# Patient Record
Sex: MALE | Race: WHITE | NOT HISPANIC OR LATINO | Employment: FULL TIME | URBAN - METROPOLITAN AREA
[De-identification: names, ages, dates, MRNs, and addresses within clinical notes are randomized per-mention and may not be internally consistent; named-entity substitution may affect disease eponyms.]

---

## 2018-12-10 ENCOUNTER — OFFICE VISIT (OUTPATIENT)
Dept: FAMILY MEDICINE CLINIC | Facility: CLINIC | Age: 33
End: 2018-12-10
Payer: COMMERCIAL

## 2018-12-10 VITALS
DIASTOLIC BLOOD PRESSURE: 88 MMHG | HEART RATE: 86 BPM | WEIGHT: 252 LBS | HEIGHT: 73 IN | BODY MASS INDEX: 33.4 KG/M2 | TEMPERATURE: 99.1 F | SYSTOLIC BLOOD PRESSURE: 136 MMHG

## 2018-12-10 DIAGNOSIS — F40.243 FEAR OF FLYING: ICD-10-CM

## 2018-12-10 DIAGNOSIS — G47.00 INSOMNIA, UNSPECIFIED TYPE: Primary | ICD-10-CM

## 2018-12-10 PROCEDURE — 3008F BODY MASS INDEX DOCD: CPT | Performed by: NURSE PRACTITIONER

## 2018-12-10 PROCEDURE — 99203 OFFICE O/P NEW LOW 30 MIN: CPT | Performed by: NURSE PRACTITIONER

## 2018-12-10 PROCEDURE — 1036F TOBACCO NON-USER: CPT | Performed by: NURSE PRACTITIONER

## 2018-12-10 RX ORDER — LORAZEPAM 0.5 MG/1
0.5 TABLET ORAL 2 TIMES DAILY PRN
Qty: 8 TABLET | Refills: 0 | Status: SHIPPED | OUTPATIENT
Start: 2018-12-10 | End: 2019-03-27 | Stop reason: ALTCHOICE

## 2018-12-10 RX ORDER — TRAZODONE HYDROCHLORIDE 50 MG/1
50-100 TABLET ORAL
Qty: 30 TABLET | Refills: 0 | Status: SHIPPED | OUTPATIENT
Start: 2018-12-10 | End: 2018-12-21 | Stop reason: SDUPTHER

## 2018-12-10 RX ORDER — HYDROXYZINE PAMOATE 50 MG/1
CAPSULE ORAL
Refills: 0 | COMMUNITY
Start: 2018-11-28 | End: 2018-12-10 | Stop reason: SDUPTHER

## 2018-12-10 RX ORDER — HYDROXYZINE PAMOATE 50 MG/1
50 CAPSULE ORAL
Qty: 30 CAPSULE | Refills: 0 | Status: SHIPPED | OUTPATIENT
Start: 2018-12-10 | End: 2019-03-27 | Stop reason: ALTCHOICE

## 2018-12-21 DIAGNOSIS — G47.00 INSOMNIA, UNSPECIFIED TYPE: ICD-10-CM

## 2018-12-21 RX ORDER — TRAZODONE HYDROCHLORIDE 50 MG/1
50-100 TABLET ORAL
Qty: 60 TABLET | Refills: 3 | Status: SHIPPED | OUTPATIENT
Start: 2018-12-21 | End: 2019-09-11 | Stop reason: SDUPTHER

## 2018-12-21 NOTE — TELEPHONE ENCOUNTER
Pt called requesting med refill for his trazodone 50mg to be sent to [facility] pharmacy  As per pt med is working for him and you had advised to call back when he needed his refill as per pt he only received a 15 day supply   Pt was seen 12/10/18

## 2019-03-27 ENCOUNTER — OFFICE VISIT (OUTPATIENT)
Dept: FAMILY MEDICINE CLINIC | Facility: CLINIC | Age: 34
End: 2019-03-27
Payer: COMMERCIAL

## 2019-03-27 VITALS
SYSTOLIC BLOOD PRESSURE: 160 MMHG | TEMPERATURE: 99 F | DIASTOLIC BLOOD PRESSURE: 94 MMHG | HEIGHT: 73 IN | BODY MASS INDEX: 32.13 KG/M2 | RESPIRATION RATE: 20 BRPM | WEIGHT: 242.4 LBS | HEART RATE: 96 BPM

## 2019-03-27 DIAGNOSIS — I10 ESSENTIAL HYPERTENSION: Primary | ICD-10-CM

## 2019-03-27 DIAGNOSIS — E66.09 CLASS 1 OBESITY DUE TO EXCESS CALORIES WITH SERIOUS COMORBIDITY AND BODY MASS INDEX (BMI) OF 31.0 TO 31.9 IN ADULT: ICD-10-CM

## 2019-03-27 PROBLEM — E66.811 CLASS 1 OBESITY DUE TO EXCESS CALORIES WITH SERIOUS COMORBIDITY AND BODY MASS INDEX (BMI) OF 31.0 TO 31.9 IN ADULT: Status: ACTIVE | Noted: 2019-03-27

## 2019-03-27 PROCEDURE — 1036F TOBACCO NON-USER: CPT | Performed by: NURSE PRACTITIONER

## 2019-03-27 PROCEDURE — 99214 OFFICE O/P EST MOD 30 MIN: CPT | Performed by: NURSE PRACTITIONER

## 2019-03-27 PROCEDURE — 3008F BODY MASS INDEX DOCD: CPT | Performed by: NURSE PRACTITIONER

## 2019-03-27 RX ORDER — LOSARTAN POTASSIUM 50 MG/1
50 TABLET ORAL DAILY
Qty: 30 TABLET | Refills: 0 | Status: SHIPPED | OUTPATIENT
Start: 2019-03-27 | End: 2019-04-22 | Stop reason: SDUPTHER

## 2019-03-27 NOTE — PROGRESS NOTES
Chief Complaint   Patient presents with    Hypertension     noticed 3 weeks ago  started with slight headache       Assessment/Plan:    Essential hypertension  Patient new diagnosis for hypertension  We will start losartan at 50mg daily  Recheck bp in 4 weeks  Diagnoses and all orders for this visit:    Essential hypertension  -     TSH, 3rd generation with Free T4 reflex; Future  -     Comprehensive metabolic panel  -     Lipid panel  -     losartan (COZAAR) 50 mg tablet; Take 1 tablet (50 mg total) by mouth daily    Class 1 obesity due to excess calories with serious comorbidity and body mass index (BMI) of 31 0 to 31 9 in adult    Other orders  -     Cancel: TDAP Vaccine greater than or equal to 6yo  -     Cancel: PREFERRED: influenza vaccine, [id number], quadrivalent, recombinant, PF, 0 5 mL (FLUBLOK)          Subjective:      Patient ID: Chester Goodpasture is a 35 y o  male  Patient states he felt weird and checked his blood pressure and it was elevated  He purchased a home machine and started keeping track  Patient went to [facility] where he received a reading of 130/90  He was told to stop checking so frequently  He did this and last BP reading was 164/ 99 while he was at home sitting on the couch  Patient states he been eating better and having more veggies  He lost some weight about 10 pounds  The following portions of the patient's history were reviewed and updated as appropriate: allergies, current medications, past family history, past medical history, past social history, past surgical history and problem list     Review of Systems   Constitutional: Negative for diaphoresis, fatigue and unexpected weight change  Feels off    HENT: Negative for trouble swallowing  Eyes: Negative for visual disturbance  Respiratory: Negative for cough, chest tightness and shortness of breath  Cardiovascular: Negative for chest pain, palpitations and leg swelling     Gastrointestinal: Negative for constipation  Endocrine: Negative for polydipsia, polyphagia and polyuria  Genitourinary: Negative for difficulty urinating  Musculoskeletal: Negative for arthralgias and myalgias  Neurological: Positive for headaches  Negative for dizziness and light-headedness  Psychiatric/Behavioral: Negative for sleep disturbance  Objective:      /94 (BP Location: Left arm, Patient Position: Sitting, Cuff Size: Standard)   Pulse 96   Temp 99 °F (37 2 °C) (Temporal)   Resp 20   Ht 6' 0 99" (1 854 m)   Wt 110 kg (242 lb 6 4 oz)   BMI 31 99 kg/m²          Physical Exam   Constitutional: He is oriented to person, place, and time  Vital signs are normal  He appears well-developed and well-nourished  He does not have a sickly appearance  He does not appear ill  HENT:   Head: Normocephalic and atraumatic  Mouth/Throat: Uvula is midline, oropharynx is clear and moist and mucous membranes are normal    Eyes: Pupils are equal, round, and reactive to light  Neck: No JVD present  Carotid bruit is not present  No thyromegaly present  Cardiovascular: Normal rate, regular rhythm, S1 normal, S2 normal and normal heart sounds  No murmur heard  No lower extremity edema    Pulmonary/Chest: Effort normal and breath sounds normal    Abdominal: Soft  Normal appearance and bowel sounds are normal    Lymphadenopathy:     He has no cervical adenopathy  Neurological: He is alert and oriented to person, place, and time  Skin: Skin is warm, dry and intact  Capillary refill takes less than 2 seconds  Psychiatric: He has a normal mood and affect  Thought content normal            BMI Counseling: Body mass index is 31 99 kg/m²  Discussed the patient's BMI with him  The BMI is above average  BMI counseling and education was provided to the patient   Nutrition recommendations include reducing portion sizes, 3-5 servings of fruits/vegetables daily, consuming healthier snacks, decreasing soda and/or juice intake, moderation in carbohydrate intake and reducing intake of saturated fat and trans fat  Exercise recommendations include moderate aerobic physical activity for 150 minutes/week and vigorous aerobic physical activity for 75 minutes/week

## 2019-03-27 NOTE — PATIENT INSTRUCTIONS
Chronic Hypertension, Ambulatory Care   GENERAL INFORMATION:   Chronic hypertension  is a long-term condition in which your blood pressure (BP) is higher than normal  Your BP is the force of your blood moving against the walls of your arteries  Hypertension is a BP of 140/90 or higher  Common symptoms include the following:   · Headache     · Blurred vision    · Chest pain     · Dizziness or weakness     · Trouble breathing     · Nosebleeds  Seek immediate care for the following symptoms:   · Severe headache or vision loss    · Weakness in an arm or leg    · Confusion or difficulty speaking    · Discomfort in your chest that feels like squeezing, pressure, fullness, or pain    · Suddenly feeling lightheaded or trouble breathing    · Pain or discomfort in your back, neck, jaw, stomach, or arm  Treatment for chronic hypertension  may include medicine to lower your BP  You may also need to make lifestyle changes  Take your medicine exactly as directed  Manage chronic hypertension:   · Take your BP at home  Sit and rest for 5 minutes before you take your BP  Extend your arm and support it on a flat surface  Your arm should be at the same level as your heart  Follow the directions that came with your BP monitor  If possible, take at least 2 BP readings each time  Take your BP at least twice a day at the same times each day, such as morning and evening  Keep a log of your BP readings and bring it to your follow-up visits  · Eat less sodium (salt)  Do not add sodium to your food  Limit foods that are high in sodium, such as canned foods, potato chips, and cold cuts  Your healthcare provider may suggest that you follow the [address] [place] [address] Street  The plan is low in sodium, unhealthy fats, and total fat  It is high in potassium, calcium, and fiber  · Exercise regularly  Exercise at least 30 minutes per day, on most days of the week  This will help decrease your BP   Ask your healthcare provider about the best exercise plan for you  · Limit alcohol  Women should limit alcohol to 1 drink a day  Men should limit alcohol to 2 drinks a day  A drink of alcohol is 12 ounces of beer, 5 ounces of wine, or 1½ ounces of liquor  · Do not smoke  If you smoke, it is never too late to quit  Smoking can increase your BP  Smoking also worsens other health conditions you may have that can increase your risk for hypertension  Ask your healthcare provider for information if you need help quitting  Follow up with your healthcare provider as directed: You will need to return to have your BP checked and to have other lab tests done  Write down your questions so you remember to ask them during your visits  CARE AGREEMENT:   You have the right to help plan your care  Learn about your health condition and how it may be treated  Discuss treatment options with your caregivers to decide what care you want to receive  You always have the right to refuse treatment  The above information is an  only  It is not intended as medical advice for individual conditions or treatments  Talk to your doctor, nurse or pharmacist before following any medical regimen to see if it is safe and effective for you  © 2014 [address] is for End User's use only and may not be sold, redistributed or otherwise used for commercial purposes  All illustrations and images included in CareNotes® are the copyrighted property of [employer]  or [employer]  Heart Healthy Diet   WHAT YOU NEED TO KNOW:   A heart healthy diet is an eating plan low in total fat, unhealthy fats, and sodium (salt)  A heart healthy diet helps decrease your risk for heart disease and stroke  Limit the amount of fat you eat to 25% to 35% of your total daily calories  Limit sodium to less than 2,300 mg each day  DISCHARGE INSTRUCTIONS:   Healthy fats:  Healthy fats can help improve cholesterol levels   The risk for heart disease is decreased when cholesterol levels are normal  Choose healthy fats, such as the following:  · Unsaturated fat  is found in foods such as soybean, canola, olive, corn, and safflower oils  It is also found in soft tub margarine that is made with liquid vegetable oil  · Omega-3 fat  is found in certain fish, such as salmon, tuna, and trout, and in walnuts and flaxseed  Unhealthy fats:  Unhealthy fats can cause unhealthy cholesterol levels in your blood and increase your risk of heart disease  Limit unhealthy fats, such as the following:  · Cholesterol  is found in animal foods, such as eggs and lobster, and in dairy products made from whole milk  Limit cholesterol to less than 300 milligrams (mg) each day  You may need to limit cholesterol to 200 mg each day if you have heart disease  · Saturated fat  is found in meats, such as grimaldo and hamburger  It is also found in chicken or turkey skin, whole milk, and butter  Limit saturated fat to less than 7% of your total daily calories  Limit saturated fat to less than 6% if you have heart disease or are at increased risk for it  · Trans fat  is found in packaged foods, such as potato chips and cookies  It is also in hard margarine, some fried foods, and shortening  Avoid trans fats as much as possible    Heart healthy foods and drinks to include:  Ask your dietitian or healthcare provider how many servings to have from each of the following food groups:  · Grains:      ¨ Whole-wheat breads, cereals, and pastas, and brown rice    ¨ Low-fat, low-sodium crackers and chips    · Vegetables:      ¨ Broccoli, green beans, green peas, and spinach    ¨ Collards, kale, and lima beans    ¨ Carrots, sweet potatoes, tomatoes, and peppers    ¨ Canned vegetables with no salt added    · Fruits:      ¨ Bananas, peaches, pears, and pineapple    ¨ Grapes, raisins, and dates    ¨ Oranges, tangerines, grapefruit, orange juice, and grapefruit juice    ¨ Apricots, mangoes, melons, and papaya    ¨ Raspberries and strawberries    ¨ Canned fruit with no added sugar    · Low-fat dairy products:      ¨ Nonfat (skim) milk, 1% milk, and low-fat almond, cashew, or soy milks fortified with calcium    ¨ Low-fat cheese, regular or frozen yogurt, and cottage cheese    · Meats and proteins , such as lean cuts of beef and pork (loin, leg, round), skinless chicken and turkey, legumes, soy products, egg whites, and nuts  Foods and drinks to limit or avoid:  Ask your dietitian or healthcare provider about these and other foods that are high in unhealthy fat, sodium, and sugar:  · Snack or packaged foods , such as frozen dinners, cookies, macaroni and cheese, and cereals with more than 300 mg of sodium per serving    · Canned or dry mixes  for cakes, soups, sauces, or gravies    · Vegetables with added sodium , such as instant potatoes, vegetables with added sauces, or regular canned vegetables    · Other foods high in sodium , such as ketchup, barbecue sauce, salad dressing, pickles, olives, soy sauce, and miso    · High-fat dairy foods  such as whole or 2% milk, cream cheese, or sour cream, and cheeses     · High-fat protein foods  such as high-fat cuts of beef (T-bone steaks, ribs), chicken or turkey with skin, and organ meats, such as liver    · Cured or smoked meats , such as hot dogs, grimaldo, and sausage    · Unhealthy fats and oils , such as butter, stick margarine, shortening, and cooking oils such as coconut or palm oil    · Food and drinks high in sugar , such as soft drinks (soda), sports drinks, sweetened tea, candy, cake, cookies, pies, and doughnuts  Other diet guidelines to follow:   · Eat more foods containing omega-3 fats  Eat fish high in omega-3 fats at least 2 times a week  · Limit alcohol  Too much alcohol can damage your heart and raise your blood pressure  Women should limit alcohol to 1 drink a day  Men should limit alcohol to 2 drinks a day   A drink of alcohol is 12 ounces of beer, 5 ounces of wine, or 1½ ounces of liquor  · Choose low-sodium foods  High-sodium foods can lead to high blood pressure  Add little or no salt to food you prepare  Use herbs and spices in place of salt  · Eat more fiber  to help lower cholesterol levels  Eat at least 5 servings of fruits and vegetables each day  Eat 3 ounces of whole-grain foods each day  Legumes (beans) are also a good source of fiber  Lifestyle guidelines:   · Do not smoke  Nicotine and other chemicals in cigarettes and cigars can cause lung and heart damage  Ask your healthcare provider for information if you currently smoke and need help to quit  E-cigarettes or smokeless tobacco still contain nicotine  Talk to your healthcare provider before you use these products  · Exercise regularly  to help you maintain a healthy weight and improve your blood pressure and cholesterol levels  Ask your healthcare provider about the best exercise plan for you  Do not start an exercise program without asking your healthcare provider  Follow up with your healthcare provider as directed:  Write down your questions so you remember to ask them during your visits  © 2017 [address] [facility] Information is for End User's use only and may not be sold, redistributed or otherwise used for commercial purposes  All illustrations and images included in CareNotes® are the copyrighted property of A D A M [employer] Inc  or [employer]use  The above information is an  only  It is not intended as medical advice for individual conditions or treatments  Talk to your doctor, nurse or pharmacist before following any medical regimen to see if it is safe and effective for you  Low Fat Diet   AMBULATORY CARE:   A low-fat diet  is an eating plan that is low in total fat, unhealthy fat, and cholesterol  You may need to follow a low-fat diet if you have trouble digesting or absorbing fat   You may also need to follow this diet if you have high cholesterol  You can also lower your cholesterol by increasing the amount of fiber in your diet  Soluble fiber is a type of fiber that helps to decrease cholesterol levels  Different types of fat in food:   · Limit unhealthy fats  A diet that is high in cholesterol, saturated fat, and trans fat may cause unhealthy cholesterol levels  Unhealthy cholesterol levels increase your risk of heart disease  ¨ Cholesterol:  Limit intake of cholesterol to less than 200 mg per day  Cholesterol is found in meat, eggs, and dairy  ¨ Saturated fat:  Limit saturated fat to less than 7% of your total daily calories  Ask your dietitian how many calories you need each day  Saturated fat is found in butter, cheese, ice cream, whole milk, and palm oil  Saturated fat is also found in meat, such as beef, pork, chicken skin, and processed meats  Processed meats include sausage, hot dogs, and bologna  ¨ Trans fat:  Avoid trans fat as much as possible  Trans fat is used in fried and baked foods  Foods that say trans fat free on the label may still have up to 0 5 grams of trans fat per serving  · Include healthy fats  Replace foods that are high in saturated and trans fat with foods high in healthy fats  This may help to decrease high cholesterol levels  ¨ Monounsaturated fats: These are found in avocados, nuts, and vegetable oils, such as olive, canola, and sunflower oil  ¨ Polyunsaturated fats: These can be found in vegetable oils, such as soybean or corn oil  Omega-3 fats can help to decrease the risk of heart disease  Omega-3 fats are found in fish, such as salmon, herring, trout, and tuna  Omega-3 fats can also be found in plant foods, such as walnuts, flaxseed, soybeans, and canola oil    Foods to limit or avoid:   · Grains:      ¨ Snacks that are made with partially hydrogenated oils, such as chips, regular crackers, and butter-flavored popcorn    ¨ High-fat baked goods, such as biscuits, croissants, doughnuts, pies, cookies, and pastries    · Dairy:      ¨ Whole milk, 2% milk, and yogurt and ice cream made with whole milk    ¨ Half and half creamer, heavy cream, and whipping cream    ¨ Cheese, cream cheese, and sour cream    · Meats and proteins:      ¨ High-fat cuts of meat (T-bone steak, regular hamburger, and ribs)    ¨ Fried meat, poultry (turkey and chicken), and fish    ¨ Poultry (chicken and turkey) with skin    ¨ Cold cuts (salami or bologna), hot dogs, grimaldo, and sausage    ¨ Whole eggs and egg yolks    · Vegetables and fruits with added fat:      ¨ Fried vegetables or vegetables in butter or high-fat sauces, such as cream or cheese sauces    ¨ Fried fruit or fruit served with butter or cream    · Fats:      ¨ Butter, stick margarine, and shortening    ¨ Coconut, palm oil, and palm kernel oil  Foods to include:   · Grains:      ¨ Whole-grain breads, cereals, pasta, and brown rice    ¨ Low-fat crackers and pretzels    · Vegetables and fruits:      ¨ Fresh, frozen, or canned vegetables (no salt or low-sodium)    ¨ Fresh, frozen, dried, or canned fruit (canned in light syrup or fruit juice)    ¨ Avocado    · Low-fat dairy products:      ¨ Nonfat (skim) or 1% milk    ¨ Nonfat or low-fat cheese, yogurt, and cottage cheese    · Meats and proteins:      ¨ Chicken or turkey with no skin    ¨ Baked or broiled fish    ¨ Lean beef and pork (loin, round, extra lean hamburger)    ¨ Beans and peas, unsalted nuts, soy products    ¨ Egg whites and substitutes    ¨ Seeds and nuts    · Fats:      ¨ Unsaturated oil, such as canola, olive, peanut, soybean, or sunflower oil    ¨ Soft or liquid margarine and vegetable oil spread    ¨ Low-fat salad dressing  Other ways to decrease fat:   · Read food labels before you buy foods  Choose foods that have less than 30% of calories from fat  Choose low-fat or fat-free dairy products  Remember that fat free does not mean calorie free   These foods still contain calories, and too many calories can lead to weight gain  · Trim fat from meat and avoid fried food  Trim all visible fat from meat before you cook it  Remove the skin from poultry  Do not cortes meat, fish, or poultry  Bake, roast, boil, or broil these foods instead  Avoid fried foods  Eat a baked potato instead of [place] fries  Steam vegetables instead of sautéing them in butter  · Add less fat to foods  Use imitation grimaldo bits on salads and baked potatoes instead of regular grimaldo bits  Use fat-free or low-fat salad dressings instead of regular dressings  Use low-fat or nonfat butter-flavored topping instead of regular butter or margarine on popcorn and other foods  Ways to decrease fat in recipes:  Replace high-fat ingredients with low-fat or nonfat ones  This may cause baked goods to be drier than usual  You may need to use nonfat cooking spray on pans to prevent food from sticking  You also may need to change the amount of other ingredients, such as water, in the recipe  Try the following:  · Use low-fat or light margarine instead of regular margarine or shortening  · Use lean ground turkey breast or chicken, or lean ground beef (less than 5% fat) instead of hamburger  · Add 1 teaspoon of canola oil to 8 ounces of skim milk instead of using cream or half and half  · Use grated zucchini, carrots, or apples in breads instead of coconut  · Use blenderized, low-fat cottage cheese, plain tofu, or low-fat ricotta cheese instead of cream cheese  · Use 1 egg white and 1 teaspoon of canola oil, or use ¼ cup (2 ounces) of fat-free egg substitute instead of a whole egg  · Replace half of the oil that is called for in a recipe with applesauce when you bake  Use 3 tablespoons of cocoa powder and 1 tablespoon of canola oil instead of a square of baking chocolate  How to increase fiber:  Eat enough high-fiber foods to get 20 to 30 grams of fiber every day   Slowly increase your fiber intake to avoid stomach cramps, gas, and other problems  · Eat 3 ounces of whole-grain foods each day  An ounce is about 1 slice of bread  Eat whole-grain breads, such as whole-wheat bread  Whole wheat, whole-wheat flour, or other whole grains should be listed as the first ingredient on the food label  Replace white flour with whole-grain flour or use half of each in recipes  Whole-grain flour is heavier than white flour, so you may have to add more yeast or baking powder  · Eat a high-fiber cereal for breakfast   Oatmeal is a good source of soluble fiber  Look for cereals that have bran or fiber in the name  Choose whole-grain products, such as brown rice, barley, and whole-wheat pasta  · Eat more beans, peas, and lentils  For example, add beans to soups or salads  Eat at least 5 cups of fruits and vegetables each day  Eat fruits and vegetables with the peel because the peel is high in fiber  © 2017 [address] Information is for End User's use only and may not be sold, redistributed or otherwise used for commercial purposes  All illustrations and images included in CareNotes® are the copyrighted property of [employer]  or [employer] Krause  The above information is an  only  It is not intended as medical advice for individual conditions or treatments  Talk to your doctor, nurse or pharmacist before following any medical regimen to see if it is safe and effective for you

## 2019-03-27 NOTE — ASSESSMENT & PLAN NOTE
Patient new diagnosis for hypertension  We will start losartan at 50mg daily  Recheck bp in 4 weeks

## 2019-04-22 ENCOUNTER — OFFICE VISIT (OUTPATIENT)
Dept: FAMILY MEDICINE CLINIC | Facility: CLINIC | Age: 34
End: 2019-04-22
Payer: COMMERCIAL

## 2019-04-22 VITALS
HEART RATE: 72 BPM | SYSTOLIC BLOOD PRESSURE: 160 MMHG | RESPIRATION RATE: 20 BRPM | HEIGHT: 72 IN | WEIGHT: 237.4 LBS | BODY MASS INDEX: 32.15 KG/M2 | TEMPERATURE: 98.6 F | DIASTOLIC BLOOD PRESSURE: 98 MMHG

## 2019-04-22 DIAGNOSIS — G47.00 INSOMNIA, UNSPECIFIED TYPE: ICD-10-CM

## 2019-04-22 DIAGNOSIS — I10 ESSENTIAL HYPERTENSION: Primary | ICD-10-CM

## 2019-04-22 PROCEDURE — 3008F BODY MASS INDEX DOCD: CPT | Performed by: NURSE PRACTITIONER

## 2019-04-22 PROCEDURE — 1036F TOBACCO NON-USER: CPT | Performed by: NURSE PRACTITIONER

## 2019-04-22 PROCEDURE — 99214 OFFICE O/P EST MOD 30 MIN: CPT | Performed by: NURSE PRACTITIONER

## 2019-04-22 RX ORDER — LOSARTAN POTASSIUM 100 MG/1
100 TABLET ORAL DAILY
Qty: 90 TABLET | Refills: 0 | Status: SHIPPED | OUTPATIENT
Start: 2019-04-22 | End: 2019-05-08 | Stop reason: SDUPTHER

## 2019-05-08 DIAGNOSIS — I10 ESSENTIAL HYPERTENSION: ICD-10-CM

## 2019-05-10 RX ORDER — LOSARTAN POTASSIUM 100 MG/1
100 TABLET ORAL DAILY
Qty: 90 TABLET | Refills: 1 | Status: SHIPPED | OUTPATIENT
Start: 2019-05-10 | End: 2019-07-23 | Stop reason: SDUPTHER

## 2019-07-23 ENCOUNTER — OFFICE VISIT (OUTPATIENT)
Dept: FAMILY MEDICINE CLINIC | Facility: CLINIC | Age: 34
End: 2019-07-23
Payer: COMMERCIAL

## 2019-07-23 VITALS
OXYGEN SATURATION: 96 % | WEIGHT: 236.4 LBS | RESPIRATION RATE: 16 BRPM | HEART RATE: 95 BPM | DIASTOLIC BLOOD PRESSURE: 92 MMHG | SYSTOLIC BLOOD PRESSURE: 148 MMHG | BODY MASS INDEX: 32.02 KG/M2 | HEIGHT: 72 IN | TEMPERATURE: 98.7 F

## 2019-07-23 DIAGNOSIS — I10 ESSENTIAL HYPERTENSION: Primary | ICD-10-CM

## 2019-07-23 DIAGNOSIS — G47.00 INSOMNIA, UNSPECIFIED TYPE: ICD-10-CM

## 2019-07-23 PROCEDURE — 99214 OFFICE O/P EST MOD 30 MIN: CPT | Performed by: NURSE PRACTITIONER

## 2019-07-23 PROCEDURE — 1036F TOBACCO NON-USER: CPT | Performed by: NURSE PRACTITIONER

## 2019-07-23 PROCEDURE — 3008F BODY MASS INDEX DOCD: CPT | Performed by: NURSE PRACTITIONER

## 2019-07-23 RX ORDER — LOSARTAN POTASSIUM 100 MG/1
100 TABLET ORAL DAILY
Qty: 90 TABLET | Refills: 1 | Status: SHIPPED | OUTPATIENT
Start: 2019-07-23 | End: 2019-12-23 | Stop reason: SDUPTHER

## 2019-07-23 NOTE — ASSESSMENT & PLAN NOTE
Patient BP is still slightly ou tof control but acceptable  Stressed compliance with meds and diet restrictions and losing weight  Continue same medications  Patient did not get labs he states he "avoiding" it

## 2019-07-23 NOTE — PROGRESS NOTES
Chief Complaint   Patient presents with    Follow-up     No complaints       Assessment/Plan:    Essential hypertension  Patient BP is still slightly ou tof control but acceptable  Stressed compliance with meds and diet restrictions and losing weight  Continue same medications  Patient did not get labs he states he "avoiding" it  Insomnia  Patient on trazodone uses when needed  Diagnoses and all orders for this visit:    Essential hypertension  -     losartan (COZAAR) 100 MG tablet; Take 1 tablet (100 mg total) by mouth daily    Insomnia, unspecified type          Subjective:      Patient ID: Raysa Hutton is a 29 y o  male  Patient states he started mount biking he done 56 rides this season  Patient states he does feel better than before and he not having as much headaches  Hypertension   This is a chronic problem  The current episode started more than 1 month ago  The problem is unchanged  The problem is uncontrolled  Pertinent negatives include no anxiety, blurred vision, headaches, palpitations, peripheral edema or shortness of breath  There are no associated agents to hypertension  Risk factors for coronary artery disease include male gender and sedentary lifestyle  Past treatments include angiotensin blockers  The current treatment provides moderate improvement  Compliance problems include exercise  The following portions of the patient's history were reviewed and updated as appropriate: allergies, current medications, past family history, past medical history, past social history, past surgical history and problem list     Review of Systems   Constitutional: Negative for fatigue  Eyes: Negative for blurred vision and visual disturbance  Respiratory: Negative for chest tightness and shortness of breath  Cardiovascular: Negative for palpitations  Neurological: Negative for dizziness and headaches     Psychiatric/Behavioral: Negative for dysphoric mood and sleep disturbance  The patient is not nervous/anxious  Objective:      /92   Pulse 95   Temp 98 7 °F (37 1 °C) (Probe)   Resp 16   Ht 6' (1 829 m)   Wt 107 kg (236 lb 6 4 oz)   SpO2 96%   BMI 32 06 kg/m²          Physical Exam   Constitutional: He is oriented to person, place, and time  Vital signs are normal  He appears well-developed and well-nourished  He does not appear ill  HENT:   Head: Normocephalic and atraumatic  Eyes: Pupils are equal    Neck: No JVD present  No thyromegaly present  Cardiovascular: Normal rate, regular rhythm, S1 normal and S2 normal    No murmur heard  No lower extremity edema    Pulmonary/Chest: Effort normal and breath sounds normal  No respiratory distress  Neurological: He is alert and oriented to person, place, and time  Psychiatric: He has a normal mood and affect   Thought content normal

## 2019-09-11 DIAGNOSIS — G47.00 INSOMNIA, UNSPECIFIED TYPE: ICD-10-CM

## 2019-09-11 RX ORDER — TRAZODONE HYDROCHLORIDE 50 MG/1
50-100 TABLET ORAL
Qty: 60 TABLET | Refills: 3 | Status: SHIPPED | OUTPATIENT
Start: 2019-09-11 | End: 2020-03-13 | Stop reason: SDUPTHER

## 2019-12-23 ENCOUNTER — OFFICE VISIT (OUTPATIENT)
Dept: FAMILY MEDICINE CLINIC | Facility: CLINIC | Age: 34
End: 2019-12-23
Payer: COMMERCIAL

## 2019-12-23 VITALS
TEMPERATURE: 99.3 F | HEIGHT: 72 IN | HEART RATE: 80 BPM | SYSTOLIC BLOOD PRESSURE: 144 MMHG | BODY MASS INDEX: 32.51 KG/M2 | WEIGHT: 240 LBS | RESPIRATION RATE: 18 BRPM | DIASTOLIC BLOOD PRESSURE: 86 MMHG

## 2019-12-23 DIAGNOSIS — Z23 ENCOUNTER FOR IMMUNIZATION: ICD-10-CM

## 2019-12-23 DIAGNOSIS — I10 ESSENTIAL HYPERTENSION: Primary | ICD-10-CM

## 2019-12-23 PROCEDURE — 90686 IIV4 VACC NO PRSV 0.5 ML IM: CPT

## 2019-12-23 PROCEDURE — 90471 IMMUNIZATION ADMIN: CPT

## 2019-12-23 PROCEDURE — 3008F BODY MASS INDEX DOCD: CPT | Performed by: NURSE PRACTITIONER

## 2019-12-23 PROCEDURE — 1036F TOBACCO NON-USER: CPT | Performed by: NURSE PRACTITIONER

## 2019-12-23 PROCEDURE — 99214 OFFICE O/P EST MOD 30 MIN: CPT | Performed by: NURSE PRACTITIONER

## 2019-12-23 RX ORDER — LOSARTAN POTASSIUM AND HYDROCHLOROTHIAZIDE 12.5; 1 MG/1; MG/1
1 TABLET ORAL DAILY
Qty: 90 TABLET | Refills: 1 | Status: SHIPPED | OUTPATIENT
Start: 2019-12-23 | End: 2020-03-13 | Stop reason: SDUPTHER

## 2019-12-23 RX ORDER — LOSARTAN POTASSIUM 100 MG/1
100 TABLET ORAL DAILY
Qty: 90 TABLET | Refills: 1 | Status: SHIPPED | OUTPATIENT
Start: 2019-12-23 | End: 2019-12-23 | Stop reason: ALTCHOICE

## 2019-12-23 NOTE — ASSESSMENT & PLAN NOTE
Patient blood pressure is elevated today  He should reduce his caffeine use to help better control his blood pressure  I will see him back in 3 months

## 2019-12-23 NOTE — PROGRESS NOTES
Assessment and Plan:    Problem List Items Addressed This Visit        Cardiovascular and Mediastinum    Essential hypertension - Primary     Patient blood pressure is elevated today  He should reduce his caffeine use to help better control his blood pressure  I will see him back in 3 months  Relevant Medications    losartan-hydrochlorothiazide (HYZAAR) 100-12 5 MG per tablet    Other Relevant Orders    TSH, 3rd generation with Free T4 reflex    Comprehensive metabolic panel    Lipid panel                 Diagnoses and all orders for this visit:    Essential hypertension  -     Discontinue: losartan (COZAAR) 100 MG tablet; Take 1 tablet (100 mg total) by mouth daily  -     TSH, 3rd generation with Free T4 reflex; Future  -     Comprehensive metabolic panel  -     Lipid panel  -     losartan-hydrochlorothiazide (HYZAAR) 100-12 5 MG per tablet; Take 1 tablet by mouth daily              Subjective:      Patient ID: Aristeo Pino is a 29 y o  male  CC:    Chief Complaint   Patient presents with    Follow-up     Patient present today for his 5 month follow up  HPI:    Home BP readings have been between 120-140s  Patient mountain bikes 2-3 times per week  Hypertension   This is a new problem  The current episode started more than 1 year ago  The problem is unchanged  Pertinent negatives include no chest pain, headaches, palpitations or shortness of breath  Associated agents: caffeine  Risk factors for coronary artery disease include stress and male gender  Past treatments include angiotensin blockers  The current treatment provides moderate improvement  Compliance problems include diet          The following portions of the patient's history were reviewed and updated as appropriate: allergies, current medications, past family history, past medical history, past social history, past surgical history and problem list       Review of Systems   Constitutional: Negative for activity change, appetite change and fever  Eyes: Negative for visual disturbance  Respiratory: Negative for chest tightness and shortness of breath  Cardiovascular: Negative for chest pain and palpitations  Neurological: Negative for dizziness, light-headedness and headaches  Psychiatric/Behavioral: Negative for dysphoric mood and sleep disturbance  The patient is nervous/anxious (notes slight anxiety )  Data to review:       Objective:    Vitals:    12/23/19 0913   BP: 144/86   BP Location: Left arm   Patient Position: Sitting   Cuff Size: Standard   Pulse: 80   Resp: 18   Temp: 99 3 °F (37 4 °C)   TempSrc: Temporal   Weight: 109 kg (240 lb)   Height: 6' (1 829 m)        Physical Exam   Constitutional: He is oriented to person, place, and time  Vital signs are normal  He appears well-developed and well-nourished  He does not appear ill  HENT:   Head: Normocephalic and atraumatic  Neck: No JVD present  Carotid bruit is not present  Cardiovascular: Normal rate, regular rhythm, S1 normal, S2 normal and normal heart sounds  No murmur heard  Pulmonary/Chest: Effort normal and breath sounds normal    Neurological: He is alert and oriented to person, place, and time  No cranial nerve deficit  Psychiatric: He has a normal mood and affect   Thought content normal

## 2019-12-23 NOTE — PATIENT INSTRUCTIONS
Problem List Items Addressed This Visit        Cardiovascular and Mediastinum    Essential hypertension - Primary     Patient blood pressure is elevated today  He should reduce his caffeine use to help better control his blood pressure  I will see him back in 3 months  DASH Eating Plan   AMBULATORY CARE:   The DASH (Dietary Approaches to Stop Hypertension) Eating Plan  is designed to help prevent or lower high blood pressure  It can also help to lower LDL (bad) cholesterol and decrease your risk for heart disease  The plan is low in sodium, sugar, unhealthy fats, and total fat  It is high in potassium, calcium, magnesium, and fiber  These nutrients are added when you eat more fruits, vegetables, and whole grains  Your sodium limit each day: Your dietitian will tell you how much sodium is safe for you to have each day  People with high blood pressure should have no more than 1,500 to 2,300 mg of sodium in a day  A teaspoon (tsp) of salt has 2,300 mg of sodium  This may seem like a difficult goal, but small changes to the foods you eat can make a big difference  Your healthcare provider or dietitian can help you create a meal plan that follows your sodium limit  How to limit sodium:   · Read food labels  Food labels can help you choose foods that are low in sodium  The amount of sodium is listed in milligrams (mg)  The % Daily Value (DV) column tells you how much of your daily needs are met by 1 serving of the food for each nutrient listed  Choose foods that have less than 5% of the DV of sodium  These foods are considered low in sodium  Foods that have 20% or more of the DV of sodium are considered high in sodium  Avoid foods that have more than 300 mg of sodium in each serving  Choose foods that say low-sodium, reduced-sodium, or no salt added on the food label  · Avoid salt  Do not salt food at the table, and add very little salt to foods during cooking   Use herbs and spices, such as onions, garlic, and salt-free seasonings to add flavor to foods  Try lemon or lime juice or vinegar to give foods a tart flavor  Use hot peppers or a small amount of hot pepper sauce to add a spicy flavor to foods  · Ask about salt substitutes  Ask your healthcare provider if you may use salt substitutes  Some salt substitutes have ingredients that can be harmful if you have certain health conditions  · Choose foods carefully at restaurants  Meals from restaurants, especially fast food restaurants, are often high in sodium  Some restaurants have nutrition information that tells you the amount of sodium in their foods  Ask to have your food prepared with less, or no salt  What you need to know about fats:   · Include healthy fats  Examples are unsaturated fats and omega-3 fatty acids  Unsaturated fats are found in soybean, canola, olive, or sunflower oil, and liquid and soft tub margarines  Omega-3 fatty acids are found in fatty fish, such as salmon, tuna, mackerel, and sardines  It is also found in flaxseed oil and ground flaxseed  · Avoid unhealthy fats  Do not eat unhealthy fats, such as saturated fats and trans fats  Saturated fats are found in foods that contain fat from animals  Examples are fatty meats, whole milk, butter, cream, and other dairy foods  It is also found in shortening, stick margarine, palm oil, and coconut oil  Trans fats are found in fried foods, crackers, chips, and baked goods made with margarine or shortening  Foods to include: With the DASH eating plan, you need to eat a certain number of servings from each food group  This will help you get enough of certain nutrients and limit others  The amount of servings you should eat depends on how many calories you need  Your dietitian can tell you how many calories you need  The number of servings listed next to the food groups below are for people who need about 2,000 calories each day    · Grains:  6 to 8 servings (3 of these servings should be whole-grain foods)    ¨ 1 slice of whole-grain bread     ¨ 1 ounce of dry cereal    ¨ ½ cup of cooked cereal, pasta, or brown rice    · Vegetables and fruits:  4 to 5 servings of fruits and 4 to 5 servings of vegetables    ¨ 1 medium fruit    ¨ ½ cup of frozen, canned (no added salt), or chopped fresh vegetables     ¨ ½ cup of fresh, frozen, dried, or canned fruit (canned in light syrup or fruit juice)    ¨ ½ cup of vegetable or fruit juice    · Dairy:  2 to 3 servings    ¨ 1 cup of nonfat (skim) or 1% milk    ¨ 1½ ounces of fat-free or low-fat cheese    ¨ 6 ounces of nonfat or low-fat yogurt    · Lean meat, poultry, and fish:  6 ounces or less    [employer] (chicken, turkey) with no skin    ¨ Fish (especially fatty fish, such as salmon, fresh tuna, or mackerel)    ¨ Lean beef and pork (loin, round, extra lean hamburger)    ¨ Egg whites and egg substitutes    · Nuts, seeds, and legumes:  4 to 5 servings each week    ¨ ½ cup of cooked beans and peas    ¨ 1½ ounces of unsalted nuts    ¨ 2 tablespoons of peanut butter or seeds    · Sweets and added sugars:  5 or less each week    ¨ 1 tablespoon of sugar, jelly, or jam    ¨ ½ cup of sorbet or gelatin    ¨ 1 cup of lemonade    · Fats:  2 to 3 servings each week    ¨ 1 teaspoon of soft margarine or vegetable oil    ¨ 1 tablespoon of mayonnaise    ¨ 2 tablespoons of salad dressing  Foods to avoid:   · Grains:      [employer], such as doughnuts, pastries, cookies, and biscuits (high in fat and sugar)    ¨ Mixes for cornbread and biscuits, packaged foods, such as bread stuffing, rice and pasta mixes, macaroni and cheese, and instant cereals (high in sodium)    · Fruits and vegetables:      ¨ Regular, canned vegetables (high in sodium)    ¨ Sauerkraut, pickled vegetables, and other foods prepared in brine (high in sodium)    ¨ Fried vegetables or vegetables in butter or high-fat sauces    ¨ Fruit in cream or butter sauce (high in fat)    · Dairy: ¨ Whole milk, 2% milk, and cream (high in fat)    ¨ Regular cheese and processed cheese (high in fat and sodium)    · Meats and protein foods:      ¨ Smoked or cured meat, such as corned beef, grimaldo, ham, hot dogs, and sausage (high in fat and sodium)    ¨ Canned beans and canned meats or spreads, such as potted meats, sardines, anchovies, and imitation seafood (high in sodium)    ¨ Deli or lunch meats, such as bologna, ham, turkey, and roast beef (high in sodium)    ¨ High-fat meat (T-bone steak, regular hamburger, and ribs)    ¨ Whole eggs and egg yolks (high in fat)    · Other:      ¨ Seasonings made with salt, such as garlic salt, celery salt, onion salt, seasoned salt, meat tenderizers, and monosodium glutamate (MSG)    ¨ Miso soup and canned or dried soup mixes (high in sodium)    ¨ Regular soy sauce, barbecue sauce, teriyaki sauce, steak sauce, Worcestershire sauce, and most flavored vinegars (high in sodium)    ¨ Regular condiments, such as mustard, ketchup, and salad dressings (high in sodium)    ¨ Gravy and sauces, such as Tomasz or cheese sauces (high in sodium and fat)    ¨ Drinks high in sugar, such as soda or fruit drinks    [employer] foods, such as salted chips, popcorn, pretzels, pork rinds, salted crackers, and salted nuts    ¨ Frozen foods, such as dinners, entrees, vegetables with sauces, and breaded meats (high in sodium)  Other guidelines to follow:   · Maintain a healthy weight  Your risk for heart disease is higher if you are overweight  Your healthcare provider may suggest that you lose weight if you are overweight  You can lose weight by eating fewer calories and foods that have added sugars and fat  The DASH meal plan can help you do this  Decrease calories by eating smaller portions at each meal and fewer snacks  Ask your healthcare provider for more information about how to lose weight  · Exercise regularly  Regular exercise can help you reach or maintain a healthy weight  Regular exercise can also help decrease your blood pressure and improve your cholesterol levels  Get 30 minutes or more of moderate exercise each day of the week  To lose weight, get at least 60 minutes of exercise  Talk to your healthcare provider about the best exercise program for you  · Limit alcohol  Women should limit alcohol to 1 drink a day  Men should limit alcohol to 2 drinks a day  A drink of alcohol is 12 ounces of beer, 5 ounces of wine, or 1½ ounces of liquor  © 2017 [address] [employer] Information is for End User's use only and may not be sold, redistributed or otherwise used for commercial purposes  All illustrations and images included in CareNotes® are the copyrighted property of A D A M [employer] Inc  or Jose Krause  The above information is an  only  It is not intended as medical advice for individual conditions or treatments  Talk to your doctor, nurse or pharmacist before following any medical regimen to see if it is safe and effective for you

## 2020-03-13 DIAGNOSIS — I10 ESSENTIAL HYPERTENSION: ICD-10-CM

## 2020-03-13 DIAGNOSIS — G47.00 INSOMNIA, UNSPECIFIED TYPE: ICD-10-CM

## 2020-03-13 NOTE — TELEPHONE ENCOUNTER
Pt called in because he wanted to cancel to his appt on 3/25/20 because of covid 19 but needs refill for  Losartan-hydrochlorothiazide please send thru mail order  and trazodone goes thru [facility]   Thank you

## 2020-03-16 RX ORDER — TRAZODONE HYDROCHLORIDE 50 MG/1
50-100 TABLET ORAL
Qty: 60 TABLET | Refills: 3 | Status: SHIPPED | OUTPATIENT
Start: 2020-03-16 | End: 2020-07-22

## 2020-03-16 RX ORDER — LOSARTAN POTASSIUM AND HYDROCHLOROTHIAZIDE 12.5; 1 MG/1; MG/1
1 TABLET ORAL DAILY
Qty: 90 TABLET | Refills: 1 | Status: SHIPPED | OUTPATIENT
Start: 2020-03-16 | End: 2020-03-17 | Stop reason: CLARIF

## 2020-03-17 ENCOUNTER — TELEPHONE (OUTPATIENT)
Dept: FAMILY MEDICINE CLINIC | Facility: CLINIC | Age: 35
End: 2020-03-17

## 2020-03-17 DIAGNOSIS — I10 ESSENTIAL HYPERTENSION: Primary | ICD-10-CM

## 2020-03-17 RX ORDER — HYDROCHLOROTHIAZIDE 12.5 MG/1
12.5 TABLET ORAL DAILY
Qty: 90 TABLET | Refills: 0 | Status: SHIPPED | OUTPATIENT
Start: 2020-03-17 | End: 2020-06-22

## 2020-03-17 RX ORDER — LOSARTAN POTASSIUM 100 MG/1
100 TABLET ORAL DAILY
Qty: 90 TABLET | Refills: 0 | Status: SHIPPED | OUTPATIENT
Start: 2020-03-17 | End: 2020-06-22

## 2020-03-17 NOTE — TELEPHONE ENCOUNTER
Pt's Losartan HCTZ combo pill is on B/O  They are available separate  Can we give 2 RX's for the medication?

## 2020-06-21 DIAGNOSIS — I10 ESSENTIAL HYPERTENSION: ICD-10-CM

## 2020-06-22 RX ORDER — HYDROCHLOROTHIAZIDE 12.5 MG/1
TABLET ORAL
Qty: 30 TABLET | Refills: 2 | Status: SHIPPED | OUTPATIENT
Start: 2020-06-22 | End: 2020-08-03 | Stop reason: SDUPTHER

## 2020-06-22 RX ORDER — LOSARTAN POTASSIUM 100 MG/1
TABLET ORAL
Qty: 30 TABLET | Refills: 2 | Status: SHIPPED | OUTPATIENT
Start: 2020-06-22 | End: 2020-08-03 | Stop reason: SDUPTHER

## 2020-07-22 DIAGNOSIS — G47.00 INSOMNIA, UNSPECIFIED TYPE: ICD-10-CM

## 2020-07-22 RX ORDER — TRAZODONE HYDROCHLORIDE 50 MG/1
50-100 TABLET ORAL
Qty: 60 TABLET | Refills: 3 | Status: SHIPPED | OUTPATIENT
Start: 2020-07-22 | End: 2020-08-03 | Stop reason: ALTCHOICE

## 2020-08-03 ENCOUNTER — TELEMEDICINE (OUTPATIENT)
Dept: FAMILY MEDICINE CLINIC | Facility: CLINIC | Age: 35
End: 2020-08-03
Payer: COMMERCIAL

## 2020-08-03 VITALS
HEIGHT: 72 IN | HEART RATE: 100 BPM | WEIGHT: 229 LBS | BODY MASS INDEX: 31.02 KG/M2 | DIASTOLIC BLOOD PRESSURE: 91 MMHG | SYSTOLIC BLOOD PRESSURE: 150 MMHG

## 2020-08-03 DIAGNOSIS — E66.09 CLASS 1 OBESITY DUE TO EXCESS CALORIES WITH SERIOUS COMORBIDITY AND BODY MASS INDEX (BMI) OF 31.0 TO 31.9 IN ADULT: ICD-10-CM

## 2020-08-03 DIAGNOSIS — I10 ESSENTIAL HYPERTENSION: Primary | ICD-10-CM

## 2020-08-03 DIAGNOSIS — G47.00 INSOMNIA, UNSPECIFIED TYPE: ICD-10-CM

## 2020-08-03 PROCEDURE — 3080F DIAST BP >= 90 MM HG: CPT | Performed by: NURSE PRACTITIONER

## 2020-08-03 PROCEDURE — 3008F BODY MASS INDEX DOCD: CPT | Performed by: NURSE PRACTITIONER

## 2020-08-03 PROCEDURE — 1036F TOBACCO NON-USER: CPT | Performed by: NURSE PRACTITIONER

## 2020-08-03 PROCEDURE — 99214 OFFICE O/P EST MOD 30 MIN: CPT | Performed by: NURSE PRACTITIONER

## 2020-08-03 PROCEDURE — 3077F SYST BP >= 140 MM HG: CPT | Performed by: NURSE PRACTITIONER

## 2020-08-03 RX ORDER — HYDROCHLOROTHIAZIDE 12.5 MG/1
12.5 TABLET ORAL DAILY
Qty: 90 TABLET | Refills: 0 | Status: SHIPPED | OUTPATIENT
Start: 2020-08-03 | End: 2020-09-28

## 2020-08-03 RX ORDER — LOSARTAN POTASSIUM 100 MG/1
100 TABLET ORAL DAILY
Qty: 90 TABLET | Refills: 1 | Status: SHIPPED | OUTPATIENT
Start: 2020-08-03 | End: 2020-12-23

## 2020-08-03 RX ORDER — ZALEPLON 5 MG/1
5-10 CAPSULE ORAL
Qty: 60 CAPSULE | Refills: 0 | Status: SHIPPED | OUTPATIENT
Start: 2020-08-03 | End: 2020-08-05 | Stop reason: SDUPTHER

## 2020-08-03 RX ORDER — PROPRANOLOL HYDROCHLORIDE 10 MG/1
10 TABLET ORAL 2 TIMES DAILY PRN
Qty: 30 TABLET | Refills: 0 | Status: SHIPPED | OUTPATIENT
Start: 2020-08-03 | End: 2020-08-28 | Stop reason: SDUPTHER

## 2020-08-03 NOTE — ASSESSMENT & PLAN NOTE
Patient is not reporting good relief with trazodone even at 100 mg  Will switch to sonata advised patient to start with 5 mg at bedtime as needed

## 2020-08-03 NOTE — ASSESSMENT & PLAN NOTE
Patient continues to increase his level of activity  He is actively losing weight and is down 11 lb from last weight

## 2020-08-03 NOTE — ASSESSMENT & PLAN NOTE
Patient's blood pressure is elevated on today's exam   On previous self-monitoring blood pressures they are doing well  Suspect interval elevations or related to increased anxiety and stress including poor sleep response as well  We discussed propanolol and patient is in agreement to taking as needed for elevated blood pressures       I have asked patient to activate my chart so that he can send further blood pressure readings and also touch base in regards to medication change

## 2020-08-03 NOTE — PROGRESS NOTES
Virtual Regular Visit      Assessment/Plan:    Problem List Items Addressed This Visit        Cardiovascular and Mediastinum    Essential hypertension - Primary     Patient's blood pressure is elevated on today's exam   On previous self-monitoring blood pressures they are doing well  Suspect interval elevations or related to increased anxiety and stress including poor sleep response as well  We discussed propanolol and patient is in agreement to taking as needed for elevated blood pressures  I have asked patient to activate my chart so that he can send further blood pressure readings and also touch base in regards to medication change           Relevant Medications    propranolol (INDERAL) 10 mg tablet    losartan (COZAAR) 100 MG tablet    hydrochlorothiazide (HYDRODIURIL) 12 5 mg tablet       Other    Class 1 obesity due to excess calories with serious comorbidity and body mass index (BMI) of 31 0 to 31 9 in adult     Patient continues to increase his level of activity  He is actively losing weight and is down 11 lb from last weight  Insomnia     Patient is not reporting good relief with trazodone even at 100 mg  Will switch to sonata advised patient to start with 5 mg at bedtime as needed  Relevant Medications    zaleplon (SONATA) 5 MG capsule               Reason for visit is   Chief Complaint   Patient presents with    Follow-up     Patient due for a follow up on his Med refills   Virtual Regular Visit        Encounter provider AHMET Hooper    Provider located at [address] [place] [address] PRIMARY [facility]ARE  Terrell HERNANDEZ  [phone]      Recent Visits  No visits were found meeting these conditions     Showing recent visits within past 7 days and meeting all other requirements     Today's Visits  Date Type Provider Dept   08/03/20 Telemedicine AHMET Nieto [facility]   Showing today's visits and meeting all other requirements     Future Appointments  No visits were found meeting these conditions  Showing future appointments within next 150 days and meeting all other requirements        The patient was identified by name and date of birth  Camelia Villareal was informed that this is a telemedicine visit and that the visit is being conducted through [employer] and patient was informed that this is not a secure, HIPAA-complaint platform  He agrees to proceed     My office door was closed  No one else was in the room  He acknowledged consent and understanding of privacy and security of the video platform  The patient has agreed to participate and understands they can discontinue the visit at any time  Patient is aware this is a billable service  Subjective  Camelia Villareal is a 28 y o  male    Patient did not have blood work completed as previously ordered  Patient is following up on his blood pressure  Patient reports that he has a few readings prior  February 11th his blood pressure is 123/71, March 2nd 133/83, May 23rd 117/76  Patient reports that he is very stressed out currently with 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  Patient is a teacher  Patient does state that there is accommodations that can be made at his job and he can become a virtual instructor the form needs to be completed  Patient also notes that with his sleep he has noticed some changes in now he is having difficulty both falling asleep and staying asleep  Patient was on trazodone which was providing some relief but is currently no longer working  History reviewed  No pertinent past medical history      Past Surgical History:   Procedure Laterality Date    NO PAST SURGERIES         Current Outpatient Medications   Medication Sig Dispense Refill    hydrochlorothiazide (HYDRODIURIL) 12 5 mg tablet Take 1 tablet (12 5 mg total) by mouth daily 90 tablet 0    losartan (COZAAR) 100 MG tablet Take 1 tablet (100 mg total) by mouth daily 90 tablet 1    propranolol (INDERAL) 10 mg tablet Take 1 tablet (10 mg total) by mouth 2 (two) times a day as needed (eleveated BP or (anxiety, stress)) 30 tablet 0    zaleplon (SONATA) 5 MG capsule Take 1-2 capsules (5-10 mg total) by mouth daily at bedtime 60 capsule 0     No current facility-administered medications for this visit  Allergies   Allergen Reactions    Penicillins Throat Swelling       Review of Systems   Constitutional: Negative  HENT: Negative  Respiratory: Negative  Cardiovascular: Negative  Gastrointestinal: Negative  Neurological: Negative  Psychiatric/Behavioral: Positive for sleep disturbance  Negative for dysphoric mood  The patient is nervous/anxious  Video Exam    Vitals:    08/03/20 0843   BP: 150/91   BP Location: Left arm   Patient Position: Sitting   Pulse: 100   Weight: 104 kg (229 lb)   Height: 6'       Physical Exam   Constitutional: He is oriented to person, place, and time  He appears well-developed  He does not appear ill  No distress  HENT:   Head: Normocephalic and atraumatic  Eyes: Conjunctivae are normal    Cardiovascular: Tachycardia present  Pulmonary/Chest: Effort normal  No respiratory distress  Abdominal: Normal appearance  Neurological: He is alert and oriented to person, place, and time  Skin: He is not diaphoretic  No pallor  Psychiatric: Mood and thought content normal    Vitals reviewed  I spent 21 minutes directly with the patient during this visit      VIRTUAL VISIT DISCLAIMER    Skyler Karen acknowledges that he has consented to an online visit or consultation  He understands that the online visit is based solely on information provided by him, and that, in the absence of a face-to-face physical evaluation by the physician, the diagnosis he receives is both limited and provisional in terms of accuracy and completeness   This is not intended to replace a full medical face-to-face evaluation by the physician  Waldemar Cage understands and accepts these terms

## 2020-08-05 ENCOUNTER — TELEPHONE (OUTPATIENT)
Dept: FAMILY MEDICINE CLINIC | Facility: CLINIC | Age: 35
End: 2020-08-05

## 2020-08-05 DIAGNOSIS — G47.00 INSOMNIA, UNSPECIFIED TYPE: ICD-10-CM

## 2020-08-05 RX ORDER — ZALEPLON 5 MG/1
5 CAPSULE ORAL
Qty: 30 CAPSULE | Refills: 0 | Status: SHIPPED | OUTPATIENT
Start: 2020-08-05 | End: 2020-08-28 | Stop reason: SDUPTHER

## 2020-08-05 NOTE — TELEPHONE ENCOUNTER
I will resent for 5mg for now  Patient can take 2 as we discussed if 5 dont work and I can always update rx

## 2020-08-05 NOTE — TELEPHONE ENCOUNTER
HN, Pharmacy states the patients insurance will only cover the 5 mg or the 10 mg of the Sonata for #30 but they will not cover for #60, What do you want to do?

## 2020-08-15 ENCOUNTER — TELEPHONE (OUTPATIENT)
Dept: OTHER | Facility: OTHER | Age: 35
End: 2020-08-15

## 2020-08-27 ENCOUNTER — TELEPHONE (OUTPATIENT)
Dept: FAMILY MEDICINE CLINIC | Facility: CLINIC | Age: 35
End: 2020-08-27

## 2020-08-27 DIAGNOSIS — G47.00 INSOMNIA, UNSPECIFIED TYPE: ICD-10-CM

## 2020-08-27 DIAGNOSIS — I10 ESSENTIAL HYPERTENSION: ICD-10-CM

## 2020-08-27 NOTE — TELEPHONE ENCOUNTER
PATIENT CALLED TO LET RAUDEL KNOW THAT THE CHANGES IN HIS MEDICATIONS IS DEFINITELY WORKING  HE IS SLEEPING BETTER, AND HE HAS HAD BETTER NUMBERS WITH HIS BLOOD PRESSURE  CAN YOU PLEASE SEND REFILLS FOR THE SONATA OR MUST HE BE SEEN? PATIENT USES [facility]   PLEASE CALL PATIENT [phone] TO ADVISE

## 2020-08-28 RX ORDER — ZALEPLON 5 MG/1
5 CAPSULE ORAL
Qty: 30 CAPSULE | Refills: 3 | Status: SHIPPED | OUTPATIENT
Start: 2020-08-28 | End: 2021-01-15 | Stop reason: SDUPTHER

## 2020-08-28 RX ORDER — PROPRANOLOL HYDROCHLORIDE 10 MG/1
10 TABLET ORAL 2 TIMES DAILY PRN
Qty: 30 TABLET | Refills: 3 | Status: SHIPPED | OUTPATIENT
Start: 2020-08-28 | End: 2021-08-19 | Stop reason: SDUPTHER

## 2020-09-26 DIAGNOSIS — I10 ESSENTIAL HYPERTENSION: ICD-10-CM

## 2020-09-28 RX ORDER — HYDROCHLOROTHIAZIDE 12.5 MG/1
TABLET ORAL
Qty: 90 TABLET | Refills: 3 | Status: SHIPPED | OUTPATIENT
Start: 2020-09-28 | End: 2020-11-16 | Stop reason: SDUPTHER

## 2020-11-16 DIAGNOSIS — I10 ESSENTIAL HYPERTENSION: ICD-10-CM

## 2020-11-16 RX ORDER — HYDROCHLOROTHIAZIDE 12.5 MG/1
12.5 TABLET ORAL DAILY
Qty: 90 TABLET | Refills: 3 | Status: SHIPPED | OUTPATIENT
Start: 2020-11-16 | End: 2021-08-30

## 2020-12-23 DIAGNOSIS — I10 ESSENTIAL HYPERTENSION: ICD-10-CM

## 2020-12-23 RX ORDER — LOSARTAN POTASSIUM 100 MG/1
TABLET ORAL
Qty: 90 TABLET | Refills: 1 | Status: SHIPPED | OUTPATIENT
Start: 2020-12-23 | End: 2021-08-05 | Stop reason: SDUPTHER

## 2021-01-05 DIAGNOSIS — G47.00 INSOMNIA, UNSPECIFIED TYPE: ICD-10-CM

## 2021-01-05 RX ORDER — ZALEPLON 5 MG/1
5 CAPSULE ORAL
Qty: 30 CAPSULE | Refills: 3 | OUTPATIENT
Start: 2021-01-05

## 2021-01-15 ENCOUNTER — TELEMEDICINE (OUTPATIENT)
Dept: FAMILY MEDICINE CLINIC | Facility: CLINIC | Age: 36
End: 2021-01-15
Payer: COMMERCIAL

## 2021-01-15 DIAGNOSIS — I10 ESSENTIAL HYPERTENSION: Primary | ICD-10-CM

## 2021-01-15 DIAGNOSIS — G47.00 INSOMNIA, UNSPECIFIED TYPE: ICD-10-CM

## 2021-01-15 DIAGNOSIS — F41.9 ANXIETY: ICD-10-CM

## 2021-01-15 PROCEDURE — 99213 OFFICE O/P EST LOW 20 MIN: CPT | Performed by: NURSE PRACTITIONER

## 2021-01-15 PROCEDURE — 1036F TOBACCO NON-USER: CPT | Performed by: NURSE PRACTITIONER

## 2021-01-15 RX ORDER — ZALEPLON 5 MG/1
5 CAPSULE ORAL
Qty: 90 CAPSULE | Refills: 0 | Status: SHIPPED | OUTPATIENT
Start: 2021-01-15 | End: 2021-11-18

## 2021-01-15 RX ORDER — LORAZEPAM 0.5 MG/1
TABLET ORAL
Qty: 1 TABLET | Refills: 0 | Status: SHIPPED | OUTPATIENT
Start: 2021-01-15 | End: 2021-11-18

## 2021-01-15 NOTE — ASSESSMENT & PLAN NOTE
Patient reports that he is doing well with his sleep using this in not only as needed    No changes recommended

## 2021-01-15 NOTE — ASSESSMENT & PLAN NOTE
Patient reports overall he has been doing well  He states that he is not monitoring his blood pressures at home as routinely but overall feeling well  Patient reports that the propanolol has been extremely beneficial and helping with his symptoms and elevated blood pressures  He is currently on losartan 100 mg and HCTZ 12 5 mg  Also discussed with patient that he really needs to get his blood work completed  Patient has extreme anxiety over this and I am in agreement given a single dose of lorazepam for this procedure only

## 2021-03-10 DIAGNOSIS — Z23 ENCOUNTER FOR IMMUNIZATION: ICD-10-CM

## 2021-06-28 DIAGNOSIS — I10 ESSENTIAL HYPERTENSION: ICD-10-CM

## 2021-06-29 RX ORDER — LOSARTAN POTASSIUM 100 MG/1
TABLET ORAL
Qty: 90 TABLET | Refills: 3 | OUTPATIENT
Start: 2021-06-29

## 2021-07-14 DIAGNOSIS — I10 ESSENTIAL HYPERTENSION: ICD-10-CM

## 2021-07-15 RX ORDER — LOSARTAN POTASSIUM 100 MG/1
TABLET ORAL
Qty: 90 TABLET | Refills: 3 | OUTPATIENT
Start: 2021-07-15

## 2021-08-05 ENCOUNTER — OFFICE VISIT (OUTPATIENT)
Dept: FAMILY MEDICINE CLINIC | Facility: CLINIC | Age: 36
End: 2021-08-05
Payer: COMMERCIAL

## 2021-08-05 VITALS
BODY MASS INDEX: 32.74 KG/M2 | HEART RATE: 100 BPM | DIASTOLIC BLOOD PRESSURE: 86 MMHG | WEIGHT: 247 LBS | SYSTOLIC BLOOD PRESSURE: 128 MMHG | HEIGHT: 73 IN

## 2021-08-05 DIAGNOSIS — I10 ESSENTIAL HYPERTENSION: Primary | ICD-10-CM

## 2021-08-05 DIAGNOSIS — G47.00 INSOMNIA, UNSPECIFIED TYPE: ICD-10-CM

## 2021-08-05 DIAGNOSIS — E66.09 CLASS 1 OBESITY DUE TO EXCESS CALORIES WITH SERIOUS COMORBIDITY AND BODY MASS INDEX (BMI) OF 32.0 TO 32.9 IN ADULT: ICD-10-CM

## 2021-08-05 DIAGNOSIS — F41.9 ANXIETY: ICD-10-CM

## 2021-08-05 PROBLEM — E66.811 CLASS 1 OBESITY DUE TO EXCESS CALORIES WITH SERIOUS COMORBIDITY AND BODY MASS INDEX (BMI) OF 31.0 TO 31.9 IN ADULT: Status: RESOLVED | Noted: 2019-03-27 | Resolved: 2021-08-05

## 2021-08-05 PROCEDURE — 3079F DIAST BP 80-89 MM HG: CPT | Performed by: NURSE PRACTITIONER

## 2021-08-05 PROCEDURE — 3725F SCREEN DEPRESSION PERFORMED: CPT | Performed by: NURSE PRACTITIONER

## 2021-08-05 PROCEDURE — 1036F TOBACCO NON-USER: CPT | Performed by: NURSE PRACTITIONER

## 2021-08-05 PROCEDURE — 99214 OFFICE O/P EST MOD 30 MIN: CPT | Performed by: NURSE PRACTITIONER

## 2021-08-05 PROCEDURE — 3074F SYST BP LT 130 MM HG: CPT | Performed by: NURSE PRACTITIONER

## 2021-08-05 PROCEDURE — 3008F BODY MASS INDEX DOCD: CPT | Performed by: NURSE PRACTITIONER

## 2021-08-05 RX ORDER — BUSPIRONE HYDROCHLORIDE 10 MG/1
10 TABLET ORAL 3 TIMES DAILY PRN
Qty: 270 TABLET | Refills: 0 | Status: SHIPPED | OUTPATIENT
Start: 2021-08-05 | End: 2021-11-18 | Stop reason: SDUPTHER

## 2021-08-05 RX ORDER — LOSARTAN POTASSIUM 100 MG/1
100 TABLET ORAL DAILY
Qty: 90 TABLET | Refills: 1 | Status: SHIPPED | OUTPATIENT
Start: 2021-08-05 | End: 2021-11-18 | Stop reason: SDUPTHER

## 2021-08-05 RX ORDER — AMLODIPINE BESYLATE 5 MG/1
5 TABLET ORAL DAILY
Qty: 90 TABLET | Refills: 1 | Status: CANCELLED | OUTPATIENT
Start: 2021-08-05

## 2021-08-05 RX ORDER — TRAZODONE HYDROCHLORIDE 50 MG/1
50-100 TABLET ORAL
Qty: 180 TABLET | Refills: 0 | Status: SHIPPED | OUTPATIENT
Start: 2021-08-05 | End: 2021-11-18 | Stop reason: SDUPTHER

## 2021-08-05 NOTE — ASSESSMENT & PLAN NOTE
Can alternate between sonata and trazodone depending on the day  Patient to not take both the same day

## 2021-08-05 NOTE — ASSESSMENT & PLAN NOTE
Patient blood pressure better reading is better  Continue with losartan, hctz  Discussed he should consider taking propranolol regularly since his BP responded very well today

## 2021-08-05 NOTE — PROGRESS NOTES
Assessment and Plan:    Problem List Items Addressed This Visit        Cardiovascular and Mediastinum    Essential hypertension - Primary     Patient blood pressure better reading is better  Continue with losartan, hctz  Discussed he should consider taking propranolol regularly since his BP responded very well today  Relevant Medications    losartan (COZAAR) 100 MG tablet    Other Relevant Orders    Lipid panel    TSH, 3rd generation with Free T4 reflex    Comprehensive metabolic panel       Other    Insomnia     Can alternate between sonata and trazodone depending on the day  Patient to not take both the same day  Relevant Medications    traZODone (DESYREL) 50 mg tablet    Anxiety     Discussed options such as SSRI  Patient willing to try buspar  If he cant tolerate buspar we will consider the SSRI  Patient also has propranolol  Relevant Medications    busPIRone (BUSPAR) 10 mg tablet      Other Visit Diagnoses     Class 1 obesity due to excess calories with serious comorbidity and body mass index (BMI) of 32 0 to 32 9 in adult                     Diagnoses and all orders for this visit:    Essential hypertension  -     losartan (COZAAR) 100 MG tablet; Take 1 tablet (100 mg total) by mouth daily  -     Lipid panel  -     TSH, 3rd generation with Free T4 reflex; Future  -     Comprehensive metabolic panel; Future    Insomnia, unspecified type  -     traZODone (DESYREL) 50 mg tablet; Take 1-2 tablets ( mg total) by mouth daily at bedtime    Anxiety  -     busPIRone (BUSPAR) 10 mg tablet; Take 1 tablet (10 mg total) by mouth 3 (three) times a day as needed (anxiety)    Class 1 obesity due to excess calories with serious comorbidity and body mass index (BMI) of 32 0 to 32 9 in adult    Other orders  -     Cancel: amLODIPine (NORVASC) 5 mg tablet; Take 1 tablet (5 mg total) by mouth daily              Subjective:      Patient ID: Aarti Andrew is a 39 y o  male      CC:    Chief Complaint   Patient presents with    Follow-up    Hypertension       HPI:    Bp at home have been in the 130s  Patient reports he taking propranolol almost regularly  He has been compliant with HCTZ and with his losartan  Patient reports he taking sonata but sometimes does not like the way he feels the next day  He uses sparingly  He considering going back to trazodone  Patient is a  and from pandemic he was very anxious and has noticed it has been very worked up even more so than ever  He states he was anxious about being around large crowds  He feels himself tensing up  He does have the propranolol which is helpful sometimes but not as effective  He reports with the pandemic and 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  The following portions of the patient's history were reviewed and updated as appropriate: allergies, current medications, past family history, past medical history, past social history, past surgical history and problem list       Review of Systems   Constitutional: Negative for diaphoresis, fatigue and unexpected weight change  HENT: Negative for trouble swallowing  Eyes: Negative for visual disturbance  Respiratory: Negative for cough, chest tightness and shortness of breath  Cardiovascular: Negative for chest pain, palpitations and leg swelling  Gastrointestinal: Negative for constipation  Endocrine: Negative for polydipsia, polyphagia and polyuria  Genitourinary: Negative for difficulty urinating  Musculoskeletal: Negative for arthralgias and myalgias  Neurological: Negative for dizziness, light-headedness and headaches  Psychiatric/Behavioral: Positive for sleep disturbance  Negative for dysphoric mood  The patient is nervous/anxious            Data to review:       Objective:    Vitals:    08/05/21 1006 08/05/21 1025   BP: 152/94 128/86   BP Location: Left arm Left arm   Patient Position: Sitting    Pulse: 100    Weight: 112 kg (247 lb)    Height: 6' 1" (1 854 m)         Physical Exam  Vitals and nursing note reviewed  Constitutional:       Appearance: He is not ill-appearing  HENT:      Head: Normocephalic and atraumatic  Right Ear: Tympanic membrane normal       Left Ear: Tympanic membrane normal    Eyes:      Extraocular Movements: Extraocular movements intact  Conjunctiva/sclera: Conjunctivae normal    Neck:      Thyroid: No thyromegaly  Vascular: No carotid bruit or JVD  Cardiovascular:      Rate and Rhythm: Normal rate and regular rhythm  Pulses:           Carotid pulses are 2+ on the right side and 2+ on the left side  Heart sounds: Normal heart sounds, S1 normal and S2 normal  No murmur heard  Pulmonary:      Effort: Pulmonary effort is normal  No respiratory distress  Breath sounds: Normal breath sounds and air entry  No wheezing  Abdominal:      General: Bowel sounds are normal  There is no distension  Palpations: Abdomen is soft  Musculoskeletal:      Right lower leg: No edema  Left lower leg: No edema  Skin:     Coloration: Skin is not pale  Findings: No erythema  Neurological:      Mental Status: He is alert and oriented to person, place, and time  Psychiatric:         Mood and Affect: Mood is anxious  Mood is not depressed  Behavior: Behavior normal          Thought Content: Thought content normal          Judgment: Judgment normal            BMI Counseling: Body mass index is 32 59 kg/m²  The BMI is above normal  Nutrition recommendations include decreasing portion sizes, moderation in carbohydrate intake, reducing intake of saturated and trans fat and reducing intake of cholesterol  Exercise recommendations include moderate physical activity 150 minutes/week and strength training exercises

## 2021-08-19 DIAGNOSIS — I10 ESSENTIAL HYPERTENSION: ICD-10-CM

## 2021-08-19 RX ORDER — PROPRANOLOL HYDROCHLORIDE 10 MG/1
10 TABLET ORAL 2 TIMES DAILY PRN
Qty: 90 TABLET | Refills: 1 | Status: SHIPPED | OUTPATIENT
Start: 2021-08-19 | End: 2021-11-18 | Stop reason: SDUPTHER

## 2021-09-16 ENCOUNTER — OFFICE VISIT (OUTPATIENT)
Dept: URGENT CARE | Facility: CLINIC | Age: 36
End: 2021-09-16
Payer: COMMERCIAL

## 2021-09-16 VITALS
TEMPERATURE: 98.3 F | HEART RATE: 83 BPM | WEIGHT: 245 LBS | SYSTOLIC BLOOD PRESSURE: 128 MMHG | RESPIRATION RATE: 16 BRPM | HEIGHT: 73 IN | DIASTOLIC BLOOD PRESSURE: 80 MMHG | BODY MASS INDEX: 32.47 KG/M2 | OXYGEN SATURATION: 98 %

## 2021-09-16 DIAGNOSIS — L03.115 CELLULITIS OF RIGHT LOWER EXTREMITY: Primary | ICD-10-CM

## 2021-09-16 PROCEDURE — 99213 OFFICE O/P EST LOW 20 MIN: CPT | Performed by: NURSE PRACTITIONER

## 2021-09-16 RX ORDER — CEPHALEXIN 500 MG/1
500 CAPSULE ORAL EVERY 6 HOURS SCHEDULED
Qty: 28 CAPSULE | Refills: 0 | Status: SHIPPED | OUTPATIENT
Start: 2021-09-16 | End: 2021-09-23

## 2021-09-16 NOTE — PATIENT INSTRUCTIONS
Cellulitis   WHAT YOU NEED TO KNOW:   Cellulitis is a skin infection caused by bacteria  Cellulitis is common and can become severe  Cellulitis usually appears on the lower legs  It can also appear on the arms, face, and other areas  Cellulitis develops when bacteria enter a crack or break in your skin, such as a scratch, bite, or cut  DISCHARGE INSTRUCTIONS:   Return to the emergency department if:   · Your wound gets larger and more painful  · You feel a crackling under your skin when you touch it  · You have purple dots or bumps on your skin, or you see bleeding under your skin  · You see red streaks coming from the infected area  Call your doctor if:   · The red, warm, swollen area gets larger  · Your fever or pain does not go away or gets worse  · The area does not get smaller after 3 days of antibiotics  · You have questions or concerns about your condition or care  Medicines: You should start to see improvement in 3 days  If cellulitis is not treated, the infection can spread through your body and become life-threatening  You may need any of the following medicines:  · Antibiotics  help treat the bacterial infection  · Acetaminophen  decreases pain and fever  It is available without a doctor's order  Ask how much to take and how often to take it  Follow directions  Read the labels of all other medicines you are using to see if they also contain acetaminophen, or ask your doctor or pharmacist  Acetaminophen can cause liver damage if not taken correctly  Do not use more than 4 grams (4,000 milligrams) total of acetaminophen in one day  · NSAIDs , such as ibuprofen, help decrease swelling, pain, and fever  This medicine is available with or without a doctor's order  NSAIDs can cause stomach bleeding or kidney problems in certain people  If you take blood thinner medicine, always ask your healthcare provider if NSAIDs are safe for you   Always read the medicine label and follow directions  · Take your medicine as directed  Contact your healthcare provider if you think your medicine is not helping or if you have side effects  Tell him or her if you are allergic to any medicine  Keep a list of the medicines, vitamins, and herbs you take  Include the amounts, and when and why you take them  Bring the list or the pill bottles to follow-up visits  Carry your medicine list with you in case of an emergency  Self-care:   · Wash the area with soap and water every day  Gently pat dry  Use bandages if directed by your healthcare provider  · Elevate the area above the level of your heart  as often as you can  This will help decrease swelling and pain  Prop the area on pillows or blankets to keep it elevated comfortably  · Place a cool, damp cloth on the area  Use clean cloths and clean water  You can do this as often as you need to  Cool, damp cloths may help decrease pain  · Apply cream or ointment as directed  These help protect the area  Most over-the-counter products, such as petroleum jelly, are good to use  Ask your healthcare provider about specific creams or ointments you should use  Prevent cellulitis:   · Do not scratch bug bites or areas of injury  You increase your risk for cellulitis by scratching these areas  · Do not share personal items, such as towels, clothing, and razors  · Clean exercise equipment  with germ-killing  before and after you use it  · Treat athlete's foot  This can help prevent the spread of a bacterial skin infection  · Wash your hands often  Use soap and water  Wash your hands after you use the bathroom, change a child's diapers, or sneeze  Wash your hands before you prepare or eat food  Use lotion to prevent dry, cracked skin  Follow up with your doctor within 3 days, or as directed:  He or she will check if your cellulitis is getting better   Write down your questions so you remember to ask them during your visits  © Copyright [employer] 2021 Information is for End User's use only and may not be sold, redistributed or otherwise used for commercial purposes  All illustrations and images included in CareNotes® are the copyrighted property of A D A M , Inc  or Oly Farris  The above information is an  only  It is not intended as medical advice for individual conditions or treatments  Talk to your doctor, nurse or pharmacist before following any medical regimen to see if it is safe and effective for you

## 2021-09-16 NOTE — PROGRESS NOTES
[facility]        NAME: Bernie Perla is a 39 y o  male  : 1985    MRN: [id number]  DATE: 2021  TIME: 1:33 PM    Assessment and Plan   Cellulitis of right lower extremity [L03 115]  1  Cellulitis of right lower extremity  cephalexin (KEFLEX) 500 mg capsule   recommend warm compresses, neosporin, and bandage   Start oral antibiotics  H/o penicillin allergy - does not recall ever taking cephalosporins  Discussed less than 20% cross allergen - will try keflex -reviewed when to stop the medication and call the office   Pt in agreement with plan of care      Patient Instructions     Follow up with PCP in 3-5 days  Proceed to  ER if symptoms worsen  Chief Complaint     Chief Complaint   Patient presents with    Leg Pain     Pt reports cutting brush and cutting his right lower leg on   Pt states since then his right lower leg has become swollen, red, and sore  Denies fever  Last Tetanus   History of Present Illness   Bernie Perla presents to the clinic c/o    Leg Pain (Pt reports cutting brush and cutting his right lower leg on   Pt states since then his right lower leg has become swollen, red, and sore  Denies fever  Last Tetanus  )  Was under a boat helping to put a cover on - unsure what cut his leg  Did clean it and applied neosporin and bandage  Redness started worsening over the past 24 hours  Review of Systems   Review of Systems   All other systems reviewed and are negative          Current Medications     Long-Term Medications   Medication Sig Dispense Refill    busPIRone (BUSPAR) 10 mg tablet Take 1 tablet (10 mg total) by mouth 3 (three) times a day as needed (anxiety) 270 tablet 0    hydrochlorothiazide (HYDRODIURIL) 12 5 mg tablet TAKE 1 TABLET BY MOUTH  DAILY 90 tablet 0    losartan (COZAAR) 100 MG tablet Take 1 tablet (100 mg total) by mouth daily 90 tablet 1    propranolol (INDERAL) 10 mg tablet Take 1 tablet (10 mg total) by mouth 2 (two) times a day as needed (eleveated BP or (anxiety, stress)) 90 tablet 1    traZODone (DESYREL) 50 mg tablet Take 1-2 tablets ( mg total) by mouth daily at bedtime 180 tablet 0    LORazepam (ATIVAN) 0 5 mg tablet Once 30 minute before procedure (Patient not taking: Reported on 9/16/2021) 1 tablet 0    zaleplon (SONATA) 5 MG capsule Take 1 capsule (5 mg total) by mouth daily at bedtime (Patient not taking: Reported on 9/16/2021) 90 capsule 0       Current Allergies     Allergies as of 09/16/2021 - Reviewed 09/16/2021   Allergen Reaction Noted    Penicillins Throat Swelling 12/10/2018            The following portions of the patient's history were reviewed and updated as appropriate: allergies, current medications, past family history, past medical history, past social history, past surgical history and problem list     Objective   /80   Pulse 83   Temp 98 3 °F (36 8 °C) (Tympanic)   Resp 16   Ht 6' 1" (1 854 m)   Wt 111 kg (245 lb)   SpO2 98%   BMI 32 32 kg/m²        Physical Exam     Physical Exam  Vitals and nursing note reviewed  Constitutional:       Appearance: Normal appearance  He is well-developed  HENT:      Head: Normocephalic and atraumatic  Eyes:      General: Lids are normal       Conjunctiva/sclera: Conjunctivae normal       Pupils: Pupils are equal, round, and reactive to light  Cardiovascular:      Rate and Rhythm: Normal rate and regular rhythm  Heart sounds: Normal heart sounds, S1 normal and S2 normal    Pulmonary:      Effort: Pulmonary effort is normal       Breath sounds: Normal breath sounds  Musculoskeletal:        Legs:       Comments: Interior ecchymosis with surrounding erythema and tenderness  Skin:     General: Skin is warm and dry  Neurological:      Mental Status: He is alert  Psychiatric:         Speech: Speech normal          Behavior: Behavior normal          Thought Content:  Thought content normal          Judgment: Judgment normal

## 2021-11-18 ENCOUNTER — OFFICE VISIT (OUTPATIENT)
Dept: FAMILY MEDICINE CLINIC | Facility: CLINIC | Age: 36
End: 2021-11-18
Payer: COMMERCIAL

## 2021-11-18 VITALS
DIASTOLIC BLOOD PRESSURE: 86 MMHG | WEIGHT: 252 LBS | SYSTOLIC BLOOD PRESSURE: 132 MMHG | HEIGHT: 73 IN | TEMPERATURE: 98.3 F | HEART RATE: 86 BPM | BODY MASS INDEX: 33.4 KG/M2

## 2021-11-18 DIAGNOSIS — I10 ESSENTIAL HYPERTENSION: Primary | ICD-10-CM

## 2021-11-18 DIAGNOSIS — Z23 NEED FOR INFLUENZA VACCINATION: ICD-10-CM

## 2021-11-18 DIAGNOSIS — G47.00 INSOMNIA, UNSPECIFIED TYPE: ICD-10-CM

## 2021-11-18 DIAGNOSIS — F41.9 ANXIETY: ICD-10-CM

## 2021-11-18 PROCEDURE — 99214 OFFICE O/P EST MOD 30 MIN: CPT | Performed by: NURSE PRACTITIONER

## 2021-11-18 PROCEDURE — 90471 IMMUNIZATION ADMIN: CPT | Performed by: NURSE PRACTITIONER

## 2021-11-18 PROCEDURE — 90686 IIV4 VACC NO PRSV 0.5 ML IM: CPT | Performed by: NURSE PRACTITIONER

## 2021-11-18 RX ORDER — LOSARTAN POTASSIUM 100 MG/1
100 TABLET ORAL DAILY
Qty: 90 TABLET | Refills: 1 | Status: SHIPPED | OUTPATIENT
Start: 2021-11-18 | End: 2022-05-16

## 2021-11-18 RX ORDER — ESCITALOPRAM OXALATE 10 MG/1
10 TABLET ORAL DAILY
Qty: 90 TABLET | Refills: 1 | Status: SHIPPED | OUTPATIENT
Start: 2021-11-18 | End: 2022-05-16

## 2021-11-18 RX ORDER — HYDROCHLOROTHIAZIDE 12.5 MG/1
12.5 TABLET ORAL DAILY
Qty: 90 TABLET | Refills: 1 | Status: SHIPPED | OUTPATIENT
Start: 2021-11-18 | End: 2022-05-11 | Stop reason: SDUPTHER

## 2021-11-18 RX ORDER — TRAZODONE HYDROCHLORIDE 100 MG/1
100 TABLET ORAL
Qty: 90 TABLET | Refills: 1 | Status: SHIPPED | OUTPATIENT
Start: 2021-11-18 | End: 2022-05-16

## 2021-11-18 RX ORDER — PROPRANOLOL HYDROCHLORIDE 10 MG/1
10 TABLET ORAL 2 TIMES DAILY PRN
Qty: 90 TABLET | Refills: 1 | Status: SHIPPED | OUTPATIENT
Start: 2021-11-18 | End: 2022-02-14

## 2021-11-18 RX ORDER — BUSPIRONE HYDROCHLORIDE 10 MG/1
10 TABLET ORAL 3 TIMES DAILY PRN
Qty: 270 TABLET | Refills: 1 | Status: SHIPPED | OUTPATIENT
Start: 2021-11-18 | End: 2022-05-16

## 2021-12-21 ENCOUNTER — RA CDI HCC (OUTPATIENT)
Dept: OTHER | Facility: HOSPITAL | Age: 36
End: 2021-12-21

## 2021-12-30 ENCOUNTER — OFFICE VISIT (OUTPATIENT)
Dept: FAMILY MEDICINE CLINIC | Facility: CLINIC | Age: 36
End: 2021-12-30
Payer: COMMERCIAL

## 2021-12-30 VITALS
HEART RATE: 84 BPM | DIASTOLIC BLOOD PRESSURE: 94 MMHG | BODY MASS INDEX: 33.66 KG/M2 | HEIGHT: 73 IN | WEIGHT: 254 LBS | SYSTOLIC BLOOD PRESSURE: 130 MMHG

## 2021-12-30 DIAGNOSIS — F41.9 ANXIETY: Primary | ICD-10-CM

## 2021-12-30 PROCEDURE — 99214 OFFICE O/P EST MOD 30 MIN: CPT | Performed by: NURSE PRACTITIONER

## 2021-12-30 PROCEDURE — 3008F BODY MASS INDEX DOCD: CPT | Performed by: NURSE PRACTITIONER

## 2022-01-21 ENCOUNTER — AMB VIDEO VISIT (OUTPATIENT)
Dept: OTHER | Facility: HOSPITAL | Age: 37
End: 2022-01-21

## 2022-01-21 DIAGNOSIS — J01.00 ACUTE NON-RECURRENT MAXILLARY SINUSITIS: Primary | ICD-10-CM

## 2022-01-21 PROCEDURE — ECARE PR SL URGENT CARE VIRTUAL VISIT: Performed by: PHYSICIAN ASSISTANT

## 2022-01-21 PROCEDURE — U0003 INFECTIOUS AGENT DETECTION BY NUCLEIC ACID (DNA OR RNA); SEVERE ACUTE RESPIRATORY SYNDROME CORONAVIRUS 2 (SARS-COV-2) (CORONAVIRUS DISEASE [COVID-19]), AMPLIFIED PROBE TECHNIQUE, MAKING USE OF HIGH THROUGHPUT TECHNOLOGIES AS DESCRIBED BY CMS-2020-01-R: HCPCS | Performed by: PHYSICIAN ASSISTANT

## 2022-01-21 PROCEDURE — U0005 INFEC AGEN DETEC AMPLI PROBE: HCPCS | Performed by: PHYSICIAN ASSISTANT

## 2022-01-21 RX ORDER — DOXYCYCLINE HYCLATE 100 MG
100 TABLET ORAL 2 TIMES DAILY
Qty: 28 TABLET | Refills: 0 | Status: SHIPPED | OUTPATIENT
Start: 2022-01-21 | End: 2022-02-04

## 2022-01-21 NOTE — PATIENT INSTRUCTIONS
Testing Site Location Information    The most up-to-date information can be found at this link:  [email or web address]    [facility] (side of building), [address], [place]  · Monday - Friday: 3pm-7pm  · Drive-up and walk-up available     [facility], [address], [address]  · Monday - Thursday: 3pm-8pm  · Saturday: 9am-1pm     [address] (behind the [facility]), [address], [place] [address]  · Monday - Thursday: 10am-9pm  · Friday: 10am-5pm  · Saturday: 9am-1pm     [address] [facility], [place]   [phone]  · Monday - Thursday: 10am-9pm  · Friday: 10am-5pm  · Saturday: 9am-1pm     [address], [place] [id number]  · Monday - Friday: 3pm-7pm    Please Be Courteous:  You are being tested for novel coronavirus (COVID-19) your test is pending at this time  You need to self quarantine at least until you have the results back  This means go home and stay home  Have someone else  your medications for you and bring them to you and drop them off at your door  Tests typically come back in 1-5 days  Results can be found in the "COVID-19" section of your [employer] account  In Your Home:  If you live with other people, trying to avoid common spaces and disinfect areas that you come into contact with  Per the CDC's recommendations: persons who suspect that they might have COVID-19 should isolate, stay at home, and use a separate bedroom and bathroom if feasible  Isolation should begin even before seeking testing and before test results become available  All household members should start wearing a mask in the home, particularly in shared spaces where appropriate distancing is not possible  Take Care of Yourself:  Try to sleep on your stomach as much as possible    If you have the ability to, take vitamin D3 2000 IU by mouth daily, vitamin-C 1000 mg by mouth twice a day, a multivitamin daily to help boost your immune system  You should check your temperature twice day  Go to the emergency department for any severe shortness of breath or pulse ox less than 90%  If You Test Positive for COVID-19 (Isolate)    Everyone, regardless of vaccination status:    · Stay home for 5 days  · If you have no symptoms or your symptoms are resolving after 5 days, you can leave your house  · Continue to wear a mask around others for 5 additional days  If you have a fever, continue to stay home until your fever resolves  If You Were Exposed to Someone with COVID-19 (Quarantine)  If you:  Have been boosted  OR  Completed the primary series of Pfizer or Moderna vaccine within the last 6 months  OR  Completed the primary series of J&J vaccine within the last 2 months    · Wear a mask around others for 10 days  · Test on day 5, if possible  If you develop symptoms get a test and stay home  If you:  Completed the primary series of Pfizer or Moderna vaccine over 6 months ago and are not boosted  OR  Completed the primary series of J&J over 2 months ago and are not boosted  OR  Are unvaccinated    · Stay home for 5 days  After that continue to wear a mask around others for 5 additional days  · If you cant quarantine you must wear a mask for 10 days  · Test on day 5 if possible      If you develop symptoms get a test and stay home

## 2022-01-21 NOTE — PROGRESS NOTES
Video Visit - Bernardino Reno 39 y o  male MRN: [id number]    REQUIRED DOCUMENTATION:         1  This service was provided via [employer]  2  Provider located at [address] [place] [id number]  3  [facility] provider: Eunice Fry PA-C   4  Identify all parties in room with patient during [facility] visit:  No one else  5  After connecting through [employer], patient was identified by name and date of birth  Patient was then informed that this was a Telemedicine visit and that the exam was being conducted confidentially over secure lines  My office door was closed  No one else was in the room  Patient acknowledged consent and understanding of privacy and security of the Telemedicine visit  I informed the patient that I have reviewed their record in Epic and presented the opportunity for them to ask any questions regarding the visit today  The patient agreed to participate  VITALS: Heart Rate: 71 BPM, Respiratory Rate: 14 RPM, Temperature Unavailable° F, Blood Pressure Unavailable mmHg, Pulse Ox Unavailable % on RA    HPI  Pt reports 6 days of nasal congestion-yellow, sore throat, PND, cough  sx waxing and waning  Rapid at home test was negative  Sudafed without relief  No fevers vaccinated x 3, no known exposures to COVID    Physical Exam  Constitutional:       General: He is not in acute distress  Appearance: Normal appearance  He is not toxic-appearing  HENT:      Head: Normocephalic and atraumatic  Nose: No rhinorrhea  Comments: Sounds mildly congested     Mouth/Throat:      Mouth: Mucous membranes are moist    Eyes:      Conjunctiva/sclera: Conjunctivae normal    Pulmonary:      Effort: Pulmonary effort is normal  No respiratory distress  Breath sounds: No wheezing (no gross audible wheeze through computer)  Musculoskeletal:      Cervical back: Normal range of motion  Skin:     Findings: No rash (on face or neck)     Neurological:      Mental Status: He is alert  Cranial Nerves: No dysarthria or facial asymmetry  Psychiatric:         Mood and Affect: Mood normal          Behavior: Behavior normal          Diagnoses and all orders for this visit:    Acute non-recurrent maxillary sinusitis  -     COVID Only - Collected at [facility]; Future  -     doxycycline hyclate (VIBRA-TABS) 100 mg tablet; Take 1 tablet (100 mg total) by mouth 2 (two) times a day for 14 days      Patient Instructions   Testing Site Location Information    The most up-to-date information can be found at this link:  [email or web address]    [facility] (side of building), [address] , [facility]  · Monday - Friday: 3pm-7pm  · Drive-up and walk-up available     [facility], [address] [facility] [address]  · Monday - Thursday: 3pm-8pm  · Saturday: 9am-1pm     [address] (behind the [facility]), [address], [place] [address]  · Monday - Thursday: 10am-9pm  · Friday: 10am-5pm  · Saturday: 9am-1pm     [address] [facility], [facility] [address]  · Monday - Thursday: 10am-9pm  · Friday: 10am-5pm  · Saturday: 9am-1pm     [address], [facility], [place] [phone]  · Monday - Friday: 3pm-7pm    Please Be Courteous:  You are being tested for novel coronavirus (COVID-19) your test is pending at this time  You need to self quarantine at least until you have the results back  This means go home and stay home  Have someone else  your medications for you and bring them to you and drop them off at your door  Tests typically come back in 1-5 days  Results can be found in the "COVID-19" section of your [employer] account  In Your Home:  If you live with other people, trying to avoid common spaces and disinfect areas that you come into contact with    Per the CDC's recommendations: persons who suspect that they might have COVID-19 should isolate, stay at home, and use a separate bedroom and bathroom if feasible  Isolation should begin even before seeking testing and before test results become available  All household members should start wearing a mask in the home, particularly in shared spaces where appropriate distancing is not possible  Take Care of Yourself:  Try to sleep on your stomach as much as possible  If you have the ability to, take vitamin D3 2000 IU by mouth daily, vitamin-C 1000 mg by mouth twice a day, a multivitamin daily to help boost your immune system  You should check your temperature twice day  Go to the emergency department for any severe shortness of breath or pulse ox less than 90%  If You Test Positive for COVID-19 (Isolate)    Everyone, regardless of vaccination status:    · Stay home for 5 days  · If you have no symptoms or your symptoms are resolving after 5 days, you can leave your house  · Continue to wear a mask around others for 5 additional days  If you have a fever, continue to stay home until your fever resolves  If You Were Exposed to Someone with COVID-19 (Quarantine)  If you:  Have been boosted  OR  Completed the primary series of Pfizer or Moderna vaccine within the last 6 months  OR  Completed the primary series of J&J vaccine within the last 2 months    · Wear a mask around others for 10 days  · Test on day 5, if possible  If you develop symptoms get a test and stay home  If you:  Completed the primary series of Pfizer or Moderna vaccine over 6 months ago and are not boosted  OR  Completed the primary series of J&J over 2 months ago and are not boosted  OR  Are unvaccinated    · Stay home for 5 days  After that continue to wear a mask around others for 5 additional days  · If you cant quarantine you must wear a mask for 10 days  · Test on day 5 if possible      If you develop symptoms get a test and stay home

## 2022-01-21 NOTE — CARE ANYWHERE EVISITS
Visit Summary for [facility]   Eliseo - Gender: Male - Date of Birth: [id number]  Date: [id number] - Duration: 14 minutes  Patient: [facility]   Eliseo  Provider: Carlota Russell PA-C    Patient Contact Information  Address  [address] [place], [place] [address]; [facility] [address]  [phone]    Visit Topics    Triage Questions   What is your current physical address in the event of a medical emergency? Answer []  Are you allergic to any medications? Answer []  Are you now or could you be pregnant? Answer []  Do you have any immune system compromise or chronic lung   disease? Answer []  Do you have any vulnerable family members in the home (infant, pregnant, cancer, elderly)? Answer []     Conversation Transcripts  [0A][0A] [Notification] You are connected with Calrota Russell PA-C, Urgent Care Specialist [0A][Notification] Roque Reza is located in [place]  [0A][Notification] Roque Reza has shared health history  Kian Lopez  [0A]    Diagnosis  Acute maxillary sinusitis    Procedures  Value: [id number] Code: CPT-4 UNLISTED E&M SERVICE    Medications Prescribed    No prescriptions ordered    Electronically signed by: Mariola Davidson(NPI [id number])

## 2022-02-14 DIAGNOSIS — I10 ESSENTIAL HYPERTENSION: ICD-10-CM

## 2022-02-14 RX ORDER — PROPRANOLOL HYDROCHLORIDE 10 MG/1
10 TABLET ORAL 2 TIMES DAILY PRN
Qty: 60 TABLET | Refills: 2 | Status: SHIPPED | OUTPATIENT
Start: 2022-02-14

## 2022-05-11 DIAGNOSIS — I10 ESSENTIAL HYPERTENSION: ICD-10-CM

## 2022-05-13 RX ORDER — HYDROCHLOROTHIAZIDE 12.5 MG/1
12.5 TABLET ORAL DAILY
Qty: 30 TABLET | Refills: 0 | Status: SHIPPED | OUTPATIENT
Start: 2022-05-13 | End: 2022-05-27 | Stop reason: SDUPTHER

## 2022-05-14 DIAGNOSIS — I10 ESSENTIAL HYPERTENSION: ICD-10-CM

## 2022-05-14 DIAGNOSIS — G47.00 INSOMNIA, UNSPECIFIED TYPE: ICD-10-CM

## 2022-05-14 DIAGNOSIS — F41.9 ANXIETY: ICD-10-CM

## 2022-05-16 RX ORDER — ESCITALOPRAM OXALATE 10 MG/1
TABLET ORAL
Qty: 30 TABLET | Refills: 5 | Status: SHIPPED | OUTPATIENT
Start: 2022-05-16 | End: 2022-05-27 | Stop reason: SDUPTHER

## 2022-05-16 RX ORDER — BUSPIRONE HYDROCHLORIDE 10 MG/1
TABLET ORAL
Qty: 90 TABLET | Refills: 5 | Status: SHIPPED | OUTPATIENT
Start: 2022-05-16

## 2022-05-16 RX ORDER — LOSARTAN POTASSIUM 100 MG/1
TABLET ORAL
Qty: 30 TABLET | Refills: 5 | Status: SHIPPED | OUTPATIENT
Start: 2022-05-16 | End: 2022-05-27 | Stop reason: SDUPTHER

## 2022-05-16 RX ORDER — TRAZODONE HYDROCHLORIDE 100 MG/1
TABLET ORAL
Qty: 30 TABLET | Refills: 5 | Status: SHIPPED | OUTPATIENT
Start: 2022-05-16

## 2022-05-27 ENCOUNTER — OFFICE VISIT (OUTPATIENT)
Dept: FAMILY MEDICINE CLINIC | Facility: CLINIC | Age: 37
End: 2022-05-27
Payer: COMMERCIAL

## 2022-05-27 VITALS
DIASTOLIC BLOOD PRESSURE: 88 MMHG | SYSTOLIC BLOOD PRESSURE: 118 MMHG | HEIGHT: 73 IN | BODY MASS INDEX: 32.58 KG/M2 | HEART RATE: 94 BPM | WEIGHT: 245.8 LBS

## 2022-05-27 DIAGNOSIS — G47.00 INSOMNIA, UNSPECIFIED TYPE: ICD-10-CM

## 2022-05-27 DIAGNOSIS — I10 ESSENTIAL HYPERTENSION: Primary | ICD-10-CM

## 2022-05-27 DIAGNOSIS — F41.9 ANXIETY: ICD-10-CM

## 2022-05-27 PROCEDURE — 99214 OFFICE O/P EST MOD 30 MIN: CPT | Performed by: NURSE PRACTITIONER

## 2022-05-27 RX ORDER — LOSARTAN POTASSIUM 100 MG/1
100 TABLET ORAL DAILY
Qty: 90 TABLET | Refills: 1 | Status: SHIPPED | OUTPATIENT
Start: 2022-05-27

## 2022-05-27 RX ORDER — ESCITALOPRAM OXALATE 20 MG/1
20 TABLET ORAL DAILY
Qty: 90 TABLET | Refills: 1 | Status: SHIPPED | OUTPATIENT
Start: 2022-05-27

## 2022-05-27 RX ORDER — FEXOFENADINE HCL 180 MG/1
180 TABLET ORAL DAILY
COMMUNITY

## 2022-05-27 RX ORDER — HYDROCHLOROTHIAZIDE 12.5 MG/1
12.5 TABLET ORAL DAILY
Qty: 90 TABLET | Refills: 1 | Status: SHIPPED | OUTPATIENT
Start: 2022-05-27

## 2022-05-27 NOTE — ASSESSMENT & PLAN NOTE
He taking buspar mostly at night which helps  He is very stressed with work-we will increase lexapro 20mg  Taking propranolol once he gets to school and then occasionally after lunch

## 2022-05-27 NOTE — PROGRESS NOTES
Assessment and Plan:    Problem List Items Addressed This Visit        Cardiovascular and Mediastinum    Essential hypertension - Primary     Patient blood pressure doing well  He is on losartan 100mg and HCTZ 12 5mg  Relevant Medications    losartan (COZAAR) 100 MG tablet    hydrochlorothiazide (HYDRODIURIL) 12 5 mg tablet    Other Relevant Orders    Lipid panel    TSH, 3rd generation with Free T4 reflex    Comprehensive metabolic panel    CBC and differential       Other    Insomnia     Taking trazodone finding this effective  No change today  Does use buspar at night  Anxiety     He taking buspar mostly at night which helps  He is very stressed with work-we will increase lexapro 20mg  Taking propranolol once he gets to school and then occasionally after lunch  Relevant Medications    escitalopram (LEXAPRO) 20 mg tablet    Other Relevant Orders    TSH, 3rd generation with Free T4 reflex                 Diagnoses and all orders for this visit:    Essential hypertension  -     losartan (COZAAR) 100 MG tablet; Take 1 tablet (100 mg total) by mouth daily  -     hydrochlorothiazide (HYDRODIURIL) 12 5 mg tablet; Take 1 tablet (12 5 mg total) by mouth daily  -     Lipid panel  -     TSH, 3rd generation with Free T4 reflex; Future  -     Comprehensive metabolic panel; Future  -     CBC and differential; Future    Anxiety  -     escitalopram (LEXAPRO) 20 mg tablet; Take 1 tablet (20 mg total) by mouth daily  -     TSH, 3rd generation with Free T4 reflex; Future    Insomnia, unspecified type    Other orders  -     fexofenadine (ALLEGRA) 180 MG tablet; Take 180 mg by mouth daily            Subjective:      Patient ID: Jasiel Arango is a 40 y o  male  CC:    Chief Complaint   Patient presents with    Follow-up     Pt is present today for chronic condition follow up       HPI:    HPI     Patient presents today for routine follow up     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  He was started on lexapro and this did help significantly at first     He is back to biking and excited about his  Patient also reports the propranolol is helping a lot as well  He taking it in the morning once he gets to school and then occasionally after lunch  The following portions of the patient's history were reviewed and updated as appropriate: allergies, current medications, past family history, past medical history, past social history, past surgical history and problem list       Review of Systems   Constitutional: Negative  Respiratory: Negative  Cardiovascular: Negative  Psychiatric/Behavioral: Positive for dysphoric mood  Negative for sleep disturbance  The patient is not nervous/anxious  Data to review:       Objective:    Vitals:    05/27/22 0910   BP: 118/88   BP Location: Right arm   Patient Position: Sitting   Cuff Size: Standard   Pulse: 94   Weight: 111 kg (245 lb 12 8 oz)   Height: 6' 1" (1 854 m)        Physical Exam  Vitals and nursing note reviewed  Constitutional:       General: He is not in acute distress  Appearance: Normal appearance  He is obese  He is not ill-appearing, toxic-appearing or diaphoretic  HENT:      Head: Normocephalic and atraumatic  Right Ear: Tympanic membrane and external ear normal       Left Ear: External ear normal       Nose: Nose normal    Eyes:      Extraocular Movements: Extraocular movements intact  Conjunctiva/sclera: Conjunctivae normal       Pupils: Pupils are equal, round, and reactive to light  Neck:      Thyroid: No thyromegaly  Vascular: No carotid bruit or JVD  Cardiovascular:      Rate and Rhythm: Normal rate and regular rhythm  Pulses:           Carotid pulses are 2+ on the right side and 2+ on the left side  Heart sounds: Normal heart sounds, S1 normal and S2 normal  No murmur heard  Pulmonary:      Effort: Pulmonary effort is normal  No respiratory distress        Breath sounds: Normal breath sounds and air entry  No wheezing  Abdominal:      General: There is distension  Musculoskeletal:      Right lower leg: No edema  Left lower leg: No edema  Skin:     Coloration: Skin is not pale  Findings: No erythema  Neurological:      Mental Status: He is alert and oriented to person, place, and time  Psychiatric:         Mood and Affect: Mood normal          Behavior: Behavior normal          Thought Content: Thought content normal          Judgment: Judgment normal            BMI Counseling: Body mass index is 32 43 kg/m²  The BMI is above normal  Nutrition recommendations include decreasing portion sizes, moderation in carbohydrate intake, reducing intake of saturated and trans fat and reducing intake of cholesterol  Exercise recommendations include moderate physical activity 150 minutes/week and strength training exercises  Rationale for BMI follow-up plan is due to patient being overweight or obese  Depression Screening and Follow-up Plan: Patient was screened for depression during today's encounter  They screened negative with a PHQ-2 score of 0

## 2022-10-21 ENCOUNTER — RA CDI HCC (OUTPATIENT)
Dept: OTHER | Facility: HOSPITAL | Age: 37
End: 2022-10-21

## 2022-10-21 NOTE — PROGRESS NOTES
[facility]  coding opportunities       Chart reviewed, no opportunity found: CHART REVIEWED, NO OPPORTUNITY FOUND        Patients Insurance        Commercial Insurance: [employer]

## 2022-10-28 ENCOUNTER — OFFICE VISIT (OUTPATIENT)
Dept: FAMILY MEDICINE CLINIC | Facility: CLINIC | Age: 37
End: 2022-10-28

## 2022-10-28 VITALS
BODY MASS INDEX: 33.24 KG/M2 | WEIGHT: 250.8 LBS | HEART RATE: 76 BPM | RESPIRATION RATE: 16 BRPM | DIASTOLIC BLOOD PRESSURE: 90 MMHG | SYSTOLIC BLOOD PRESSURE: 126 MMHG | TEMPERATURE: 97.1 F | HEIGHT: 73 IN

## 2022-10-28 DIAGNOSIS — F41.9 ANXIETY: ICD-10-CM

## 2022-10-28 DIAGNOSIS — Z23 NEED FOR INFLUENZA VACCINATION: ICD-10-CM

## 2022-10-28 DIAGNOSIS — G47.00 INSOMNIA, UNSPECIFIED TYPE: ICD-10-CM

## 2022-10-28 DIAGNOSIS — I10 ESSENTIAL HYPERTENSION: Primary | ICD-10-CM

## 2022-10-28 RX ORDER — PROPRANOLOL HYDROCHLORIDE 10 MG/1
10 TABLET ORAL 2 TIMES DAILY PRN
Qty: 180 TABLET | Refills: 1 | Status: SHIPPED | OUTPATIENT
Start: 2022-10-28

## 2022-10-28 RX ORDER — ESCITALOPRAM OXALATE 20 MG/1
20 TABLET ORAL DAILY
Qty: 90 TABLET | Refills: 1 | Status: SHIPPED | OUTPATIENT
Start: 2022-10-28

## 2022-10-28 RX ORDER — TRAZODONE HYDROCHLORIDE 100 MG/1
100 TABLET ORAL
Qty: 90 TABLET | Refills: 1 | Status: SHIPPED | OUTPATIENT
Start: 2022-10-28

## 2022-10-28 RX ORDER — LOSARTAN POTASSIUM 100 MG/1
100 TABLET ORAL DAILY
Qty: 90 TABLET | Refills: 1 | Status: SHIPPED | OUTPATIENT
Start: 2022-10-28

## 2022-10-28 RX ORDER — HYDROCHLOROTHIAZIDE 12.5 MG/1
12.5 TABLET ORAL DAILY
Qty: 90 TABLET | Refills: 1 | Status: SHIPPED | OUTPATIENT
Start: 2022-10-28

## 2022-10-28 NOTE — PROGRESS NOTES
Name: Law Rosenberg      : 1985      MRN: [id number]  Encounter Provider: AHMET Ramirez  Encounter Date: 10/28/2022   Encounter department: [facility] PRIMARY CARE    Assessment & Plan     1  Essential hypertension  Assessment & Plan:  Patient blood pressure doing well  Diastolic is slightly up today  He is on losartan hctz and propranolol for management  Discussed again the importance of getting his blood work completed to assess the rest of his health  Labs have been ordered again today  Orders:  -     losartan (COZAAR) 100 MG tablet; Take 1 tablet (100 mg total) by mouth daily  -     hydrochlorothiazide (HYDRODIURIL) 12 5 mg tablet; Take 1 tablet (12 5 mg total) by mouth daily  -     propranolol (INDERAL) 10 mg tablet; Take 1 tablet (10 mg total) by mouth 2 (two) times a day as needed (eleveated BP or (anxiety, stress))    2  Anxiety  Assessment & Plan:  Patient do much better since increasing lexapro to 20mg  Seldom uses buspar  Using propranolol at least every morning  Orders:  -     escitalopram (LEXAPRO) 20 mg tablet; Take 1 tablet (20 mg total) by mouth daily    3  Insomnia, unspecified type  Assessment & Plan:  Using trazdone at bedtime  lexapro is helping control anixety at night as well     Orders:  -     traZODone (DESYREL) 100 mg tablet; Take 1 tablet (100 mg total) by mouth daily at bedtime    4  Need for influenza vaccination  -     influenza vaccine, quadrivalent, 0 5 mL, preservative-free, for adult and pediatric patients 6 mos+ (AFLURIA, FLUARIX, FLULAVAL, FLUZONE)        Depression Screening and Follow-up Plan: Patient was screened for depression during today's encounter  They screened negative with a PHQ-2 score of 0  Subjective      htn: compliant with his medications  last BP check was really controlled  currently on losartan and hctz   unfortunately patient has not done labs still despite multiple ask to do so       anxiety: has been taking buspar and lexapro  has trazadone for sleep  Since increasing lexapro reports it has been helpful  He states he didn't have moments in bed of dread  Very seldom uses buspar since now on lexapro     Sleeping much better sleeping through out the night  also on propranolol to help both anxiety and blood pressure takes it every  Morning  Review of Systems   Constitutional: Negative for appetite change  Eyes: Negative for visual disturbance  Respiratory: Negative for cough and shortness of breath  Cardiovascular: Negative for chest pain and palpitations  Gastrointestinal: Negative for nausea and vomiting  Genitourinary: Negative for dysuria  Neurological: Negative for headaches  Psychiatric/Behavioral: Negative for dysphoric mood, sleep disturbance and suicidal ideas  The patient is not nervous/anxious  All other systems reviewed and are negative  Current Outpatient Medications on File Prior to Visit   Medication Sig   • busPIRone (BUSPAR) 10 mg tablet TAKE 1 TABLET BY MOUTH 3 TIMES A DAY AS NEEDED (ANXIETY)  • fexofenadine (ALLEGRA) 180 MG tablet Take 180 mg by mouth daily       Objective     /90 (BP Location: Left arm, Patient Position: Sitting, Cuff Size: Adult)   Pulse 76   Temp (!) 97 1 °F (36 2 °C) (Temporal)   Resp 16   Ht 6' 1" (1 854 m)   Wt 114 kg (250 lb 12 8 oz)   BMI 33 09 kg/m²     Physical Exam  Vitals and nursing note reviewed  Constitutional:       Appearance: Normal appearance  He is well-developed and well-groomed  He is not ill-appearing  HENT:      Head: Normocephalic and atraumatic  Eyes:      Extraocular Movements: Extraocular movements intact  Conjunctiva/sclera: Conjunctivae normal       Pupils: Pupils are equal    Neck:      Thyroid: No thyromegaly  Vascular: No carotid bruit or JVD  Cardiovascular:      Rate and Rhythm: Normal rate and regular rhythm  Pulses:           Carotid pulses are 2+ on the right side and 2+ on the left side  Heart sounds: S1 normal and S2 normal  No murmur heard  Pulmonary:      Effort: Pulmonary effort is normal  No respiratory distress  Breath sounds: Normal breath sounds and air entry  No wheezing or rhonchi  Musculoskeletal:      Right lower leg: No edema  Left lower leg: No edema  Neurological:      Mental Status: He is alert and oriented to person, place, and time  Psychiatric:         Attention and Perception: Attention normal          Mood and Affect: Mood normal  Mood is not anxious or depressed  Behavior: Behavior is cooperative  Thought Content: Thought content normal  Thought content does not include suicidal ideation  Thought content does not include suicidal plan         Alice Hui

## 2022-10-28 NOTE — ASSESSMENT & PLAN NOTE
Patient blood pressure doing well  Diastolic is slightly up today  He is on losartan hctz and propranolol for management  Discussed again the importance of getting his blood work completed to assess the rest of his health  Labs have been ordered again today

## 2022-10-31 NOTE — ASSESSMENT & PLAN NOTE
Patient do much better since increasing lexapro to 20mg  Seldom uses buspar  Using propranolol at least every morning

## 2022-11-21 DIAGNOSIS — F41.9 ANXIETY: ICD-10-CM

## 2022-11-21 DIAGNOSIS — I10 ESSENTIAL HYPERTENSION: ICD-10-CM

## 2022-11-21 RX ORDER — LOSARTAN POTASSIUM 100 MG/1
TABLET ORAL
Qty: 90 TABLET | Refills: 3 | Status: SHIPPED | OUTPATIENT
Start: 2022-11-21

## 2022-11-21 RX ORDER — BUSPIRONE HYDROCHLORIDE 10 MG/1
TABLET ORAL
Qty: 90 TABLET | Refills: 5 | Status: SHIPPED | OUTPATIENT
Start: 2022-11-21

## 2023-03-24 ENCOUNTER — RA CDI HCC (OUTPATIENT)
Dept: OTHER | Facility: HOSPITAL | Age: 38
End: 2023-03-24

## 2023-03-31 ENCOUNTER — OFFICE VISIT (OUTPATIENT)
Dept: FAMILY MEDICINE CLINIC | Facility: CLINIC | Age: 38
End: 2023-03-31

## 2023-03-31 VITALS
OXYGEN SATURATION: 98 % | HEART RATE: 82 BPM | HEIGHT: 73 IN | BODY MASS INDEX: 32.34 KG/M2 | DIASTOLIC BLOOD PRESSURE: 80 MMHG | SYSTOLIC BLOOD PRESSURE: 126 MMHG | WEIGHT: 244 LBS

## 2023-03-31 DIAGNOSIS — G47.00 INSOMNIA, UNSPECIFIED TYPE: ICD-10-CM

## 2023-03-31 DIAGNOSIS — I10 ESSENTIAL HYPERTENSION: Primary | ICD-10-CM

## 2023-03-31 DIAGNOSIS — F41.9 ANXIETY: ICD-10-CM

## 2023-03-31 RX ORDER — HYDROCHLOROTHIAZIDE 12.5 MG/1
12.5 TABLET ORAL DAILY
Qty: 90 TABLET | Refills: 1 | Status: SHIPPED | OUTPATIENT
Start: 2023-03-31

## 2023-03-31 RX ORDER — BUSPIRONE HYDROCHLORIDE 10 MG/1
10 TABLET ORAL 3 TIMES DAILY
Qty: 90 TABLET | Refills: 5 | Status: SHIPPED | OUTPATIENT
Start: 2023-03-31

## 2023-03-31 RX ORDER — PROPRANOLOL HYDROCHLORIDE 10 MG/1
10 TABLET ORAL 2 TIMES DAILY PRN
Qty: 180 TABLET | Refills: 1 | Status: SHIPPED | OUTPATIENT
Start: 2023-03-31

## 2023-03-31 RX ORDER — ESCITALOPRAM OXALATE 20 MG/1
20 TABLET ORAL DAILY
Qty: 90 TABLET | Refills: 1 | Status: SHIPPED | OUTPATIENT
Start: 2023-03-31

## 2023-03-31 RX ORDER — LOSARTAN POTASSIUM 100 MG/1
100 TABLET ORAL DAILY
Qty: 90 TABLET | Refills: 3 | Status: SHIPPED | OUTPATIENT
Start: 2023-03-31

## 2023-03-31 RX ORDER — TRAZODONE HYDROCHLORIDE 100 MG/1
100 TABLET ORAL
Qty: 90 TABLET | Refills: 1 | Status: SHIPPED | OUTPATIENT
Start: 2023-03-31

## 2023-03-31 RX ORDER — LORAZEPAM 0.5 MG/1
0.5 TABLET ORAL EVERY 8 HOURS PRN
Qty: 4 TABLET | Refills: 0 | Status: SHIPPED | OUTPATIENT
Start: 2023-03-31

## 2023-03-31 NOTE — PROGRESS NOTES
Name: Bean Chaney      : 1985      MRN: [id number]  Encounter Provider: AHMET Delgado  Encounter Date: 3/31/2023   Encounter department: [facility] PRIMARY CARE    Assessment & Plan     1  Essential hypertension  Assessment & Plan:  Patient blood pressure is overall doing well  He is to continue with losartan and hctz   Has propranolol also as needed for anxiety     Orders:  -     propranolol (INDERAL) 10 mg tablet; Take 1 tablet (10 mg total) by mouth 2 (two) times a day as needed (eleveated BP or (anxiety, stress))  -     losartan (COZAAR) 100 MG tablet; Take 1 tablet (100 mg total) by mouth daily  -     hydrochlorothiazide (HYDRODIURIL) 12 5 mg tablet; Take 1 tablet (12 5 mg total) by mouth daily  -     Lipid panel  -     Comprehensive metabolic panel; Future  -     TSH, 3rd generation with Free T4 reflex; Future  -     CBC and differential; Future    2  Insomnia, unspecified type  Assessment & Plan:  Doing well with trazadone and lexapro     Orders:  -     traZODone (DESYREL) 100 mg tablet; Take 1 tablet (100 mg total) by mouth daily at bedtime    3  Anxiety  Assessment & Plan:  Doing very well with lexapro and trazodone  Orders:  -     escitalopram (LEXAPRO) 20 mg tablet; Take 1 tablet (20 mg total) by mouth daily  -     busPIRone (BUSPAR) 10 mg tablet; Take 1 tablet (10 mg total) by mouth 3 (three) times a day  -     LORazepam (Ativan) 0 5 mg tablet; Take 1 tablet (0 5 mg total) by mouth every 8 (eight) hours as needed for anxiety  -     TSH, 3rd generation with Free T4 reflex; Future      BMI Counseling: Body mass index is 32 19 kg/m²  The BMI is above normal  Nutrition recommendations include decreasing portion sizes, moderation in carbohydrate intake, reducing intake of saturated and trans fat and reducing intake of cholesterol  Exercise recommendations include moderate physical activity 150 minutes/week and strength training exercises   Rationale for BMI follow-up plan is due "to patient being overweight or obese  Depression Screening and Follow-up Plan: Patient was screened for depression during today's encounter  They screened negative with a PHQ-2 score of 0  Subjective      Patient presents today for routine follow up  HTN: patient is compliant with his medications  Anxiety/sleep: patient is doing well on lexparo  He is taking Trazadone and is still effective  He is still having very stressful days at work     Patient has been more active and loosing weight   Patient did covid in February overall did ok  But had 3 weeks of sinus symptoms  Review of Systems   Constitutional: Negative for unexpected weight change  Respiratory: Negative for chest tightness and shortness of breath  Cardiovascular: Negative for chest pain, palpitations and leg swelling  Gastrointestinal: Negative for abdominal pain, nausea and vomiting  Neurological: Negative for dizziness, light-headedness and headaches  Current Outpatient Medications on File Prior to Visit   Medication Sig   • fexofenadine (ALLEGRA) 180 MG tablet Take 180 mg by mouth daily   • [DISCONTINUED] busPIRone (BUSPAR) 10 mg tablet TAKE 1 TABLET BY MOUTH 3 TIMES A DAY AS NEEDED (ANXIETY)     • [DISCONTINUED] escitalopram (LEXAPRO) 20 mg tablet Take 1 tablet (20 mg total) by mouth daily   • [DISCONTINUED] hydrochlorothiazide (HYDRODIURIL) 12 5 mg tablet Take 1 tablet (12 5 mg total) by mouth daily   • [DISCONTINUED] losartan (COZAAR) 100 MG tablet TAKE 1 TABLET DAILY   • [DISCONTINUED] propranolol (INDERAL) 10 mg tablet Take 1 tablet (10 mg total) by mouth 2 (two) times a day as needed (eleveated BP or (anxiety, stress))   • [DISCONTINUED] traZODone (DESYREL) 100 mg tablet Take 1 tablet (100 mg total) by mouth daily at bedtime       Objective     /80 (BP Location: Left arm, Patient Position: Sitting, Cuff Size: Standard)   Pulse 82   Ht 6' 1\" (1 854 m)   Wt 111 kg (244 lb)   SpO2 98%   BMI 32 19 kg/m² " Physical Exam  Vitals and nursing note reviewed  Constitutional:       Appearance: Normal appearance  He is well-developed  He is obese  He is not ill-appearing  HENT:      Head: Normocephalic and atraumatic  Eyes:      Extraocular Movements: Extraocular movements intact  Conjunctiva/sclera: Conjunctivae normal       Pupils: Pupils are equal    Cardiovascular:      Rate and Rhythm: Normal rate and regular rhythm  Heart sounds: S1 normal and S2 normal  No murmur heard  Pulmonary:      Effort: Pulmonary effort is normal  No respiratory distress  Breath sounds: Normal breath sounds  Neurological:      Mental Status: He is alert and oriented to person, place, and time  Psychiatric:         Mood and Affect: Mood normal          Behavior: Behavior normal          Thought Content:  Thought content normal          Judgment: Judgment normal        AHMET Jackson

## 2023-08-29 ENCOUNTER — RA CDI HCC (OUTPATIENT)
Dept: OTHER | Facility: HOSPITAL | Age: 38
End: 2023-08-29

## 2023-08-29 NOTE — PROGRESS NOTES
[facility] coding opportunities       Chart reviewed, no opportunity found: CHART REVIEWED, NO OPPORTUNITY FOUND        Patients Insurance        Commercial Insurance: [employer]

## 2023-09-07 ENCOUNTER — OFFICE VISIT (OUTPATIENT)
Dept: FAMILY MEDICINE CLINIC | Facility: CLINIC | Age: 38
End: 2023-09-07
Payer: COMMERCIAL

## 2023-09-07 VITALS
SYSTOLIC BLOOD PRESSURE: 126 MMHG | BODY MASS INDEX: 30.96 KG/M2 | DIASTOLIC BLOOD PRESSURE: 72 MMHG | HEIGHT: 73 IN | WEIGHT: 233.6 LBS | TEMPERATURE: 96.6 F | HEART RATE: 94 BPM

## 2023-09-07 DIAGNOSIS — F41.9 ANXIETY: ICD-10-CM

## 2023-09-07 DIAGNOSIS — I10 ESSENTIAL HYPERTENSION: ICD-10-CM

## 2023-09-07 DIAGNOSIS — G47.00 INSOMNIA, UNSPECIFIED TYPE: ICD-10-CM

## 2023-09-07 PROCEDURE — 99214 OFFICE O/P EST MOD 30 MIN: CPT | Performed by: NURSE PRACTITIONER

## 2023-09-07 RX ORDER — TRAZODONE HYDROCHLORIDE 100 MG/1
100 TABLET ORAL
Qty: 90 TABLET | Refills: 1 | Status: SHIPPED | OUTPATIENT
Start: 2023-09-07

## 2023-09-07 RX ORDER — ESCITALOPRAM OXALATE 20 MG/1
20 TABLET ORAL DAILY
Qty: 90 TABLET | Refills: 1 | Status: SHIPPED | OUTPATIENT
Start: 2023-09-07

## 2023-09-07 RX ORDER — HYDROCHLOROTHIAZIDE 12.5 MG/1
12.5 TABLET ORAL DAILY
Qty: 90 TABLET | Refills: 1 | Status: SHIPPED | OUTPATIENT
Start: 2023-09-07

## 2023-09-07 RX ORDER — PROPRANOLOL HYDROCHLORIDE 10 MG/1
10 TABLET ORAL 2 TIMES DAILY PRN
Qty: 180 TABLET | Refills: 1 | Status: SHIPPED | OUTPATIENT
Start: 2023-09-07

## 2023-09-07 RX ORDER — LOSARTAN POTASSIUM 100 MG/1
100 TABLET ORAL DAILY
Qty: 90 TABLET | Refills: 3 | Status: SHIPPED | OUTPATIENT
Start: 2023-09-07

## 2023-09-07 NOTE — PROGRESS NOTES
Name: Alysia Riley      : 1985      MRN: [id number]  Encounter Provider: AHMET Luther  Encounter Date: 2023   Encounter department: [facility] PRIMARY CARE    Assessment & Plan     1. Anxiety  Assessment & Plan:  Patient anxiety doing very well on lexapro and propranolol. Continue medications. Has dog which has also been helpful     Orders:  -     escitalopram (LEXAPRO) 20 mg tablet; Take 1 tablet (20 mg total) by mouth daily    2. Essential hypertension  Assessment & Plan:  Patient blood pressure is doing well. Continue with losartan and hctz as well as propranolol     Orders:  -     hydrochlorothiazide (HYDRODIURIL) 12.5 mg tablet; Take 1 tablet (12.5 mg total) by mouth daily  -     losartan (COZAAR) 100 MG tablet; Take 1 tablet (100 mg total) by mouth daily  -     propranolol (INDERAL) 10 mg tablet; Take 1 tablet (10 mg total) by mouth 2 (two) times a day as needed (eleveated BP or (anxiety, stress))    3. Insomnia, unspecified type  Assessment & Plan: On trazodone and sleeping very well     Orders:  -     traZODone (DESYREL) 100 mg tablet; Take 1 tablet (100 mg total) by mouth daily at bedtime           Subjective      Patient presents today for follow up  He did get a dog this summer which has been helpful to help him become more active and also helping with this stress levels. School year has been difficult but he is managing his stress well   He needs refills on his medications     Review of Systems   Constitutional: Negative for activity change and unexpected weight change. Respiratory: Negative for chest tightness. Cardiovascular: Negative for chest pain and palpitations. Neurological: Negative for headaches. Psychiatric/Behavioral: Negative for dysphoric mood and sleep disturbance. The patient is not nervous/anxious.         Current Outpatient Medications on File Prior to Visit   Medication Sig   • fexofenadine (ALLEGRA) 180 MG tablet Take 180 mg by mouth daily • [DISCONTINUED] hydrochlorothiazide (HYDRODIURIL) 12.5 mg tablet Take 1 tablet (12.5 mg total) by mouth daily   • [DISCONTINUED] losartan (COZAAR) 100 MG tablet Take 1 tablet (100 mg total) by mouth daily   • [DISCONTINUED] propranolol (INDERAL) 10 mg tablet Take 1 tablet (10 mg total) by mouth 2 (two) times a day as needed (eleveated BP or (anxiety, stress))   • [DISCONTINUED] traZODone (DESYREL) 100 mg tablet Take 1 tablet (100 mg total) by mouth daily at bedtime   • [DISCONTINUED] busPIRone (BUSPAR) 10 mg tablet Take 1 tablet (10 mg total) by mouth 3 (three) times a day (Patient not taking: Reported on 9/7/2023)   • [DISCONTINUED] escitalopram (LEXAPRO) 20 mg tablet Take 1 tablet (20 mg total) by mouth daily (Patient not taking: Reported on 9/7/2023)   • [DISCONTINUED] LORazepam (Ativan) 0.5 mg tablet Take 1 tablet (0.5 mg total) by mouth every 8 (eight) hours as needed for anxiety       Objective     /72 (BP Location: Left arm, Patient Position: Sitting, Cuff Size: Standard)   Pulse 94   Temp (!) 96.6 °F (35.9 °C) (Temporal)   Ht 6' 1" (1.854 m) Comment: on file  Wt 106 kg (233 lb 9.6 oz)   BMI 30.82 kg/m²     Physical Exam  Vitals and nursing note reviewed. Constitutional:       Appearance: Normal appearance. He is well-developed. He is obese. He is not ill-appearing. HENT:      Head: Normocephalic and atraumatic. Eyes:      Extraocular Movements: Extraocular movements intact. Conjunctiva/sclera: Conjunctivae normal.      Pupils: Pupils are equal.   Cardiovascular:      Rate and Rhythm: Normal rate and regular rhythm. Heart sounds: S1 normal and S2 normal. No murmur heard. Pulmonary:      Effort: Pulmonary effort is normal. No respiratory distress. Breath sounds: Normal breath sounds. Neurological:      Mental Status: He is alert and oriented to person, place, and time.    Psychiatric:         Mood and Affect: Mood normal.         Behavior: Behavior normal.         Thought Content:  Thought content normal.         Judgment: Judgment normal.          AHMET Boyce

## 2023-09-07 NOTE — ASSESSMENT & PLAN NOTE
Patient anxiety doing very well on lexapro and propranolol. Continue medications.  Has dog which has also been helpful

## 2024-01-01 NOTE — PROGRESS NOTES
Chief Complaint   Patient presents with    Physical Exam     34 y/o male is here for yearly Physical and wants to establish PCP        Assessment/Plan:     Diagnoses and all orders for this visit:    Insomnia, unspecified type  -     traZODone (DESYREL) 50 mg tablet; Take 1-2 tablets ( mg total) by mouth daily at bedtime  -     hydrOXYzine pamoate (VISTARIL) 50 mg capsule; Take 1 capsule (50 mg total) by mouth daily at bedtime as needed for itching    Fear of flying  -     LORazepam (ATIVAN) 0 5 mg tablet; Take 1 tablet (0 5 mg total) by mouth 2 (two) times a day as needed for anxiety For flying  Other orders  -     Discontinue: hydrOXYzine pamoate (VISTARIL) 50 mg capsule; TAKE ONE CAPSULE BY MOUTH EVERY NIGHT AS NEEDED FOR SLEEP          Subjective:      Patient ID: Mk Sweeney is a 35 y o  male  Patient states he did video visit and was prescribed hydroxyzine for sleep  Patient is a teacher 8 th grade  [employer]  He states he is under a lot of pressure  Patient states he has no trouble falling asleep but he wakes up about 3-4 am and his mind starts racing  Patient states the hydroxyzine helps that if he wakes up he can typically roll over and fall back to sleep  He states his only complaint   [place] going to [place]  The following portions of the patient's history were reviewed and updated as appropriate: allergies, current medications, past family history, past medical history, past social history, past surgical history and problem list     Review of Systems   Constitutional: Positive for fatigue  Negative for diaphoresis and fever  HENT: Negative  Dry mouth   Respiratory: Negative for chest tightness and shortness of breath  Cardiovascular: Negative for chest pain and palpitations  Gastrointestinal: Negative  Endocrine: Negative for polydipsia, polyphagia and polyuria  Genitourinary: Negative      Neurological: Negative for dizziness, Did start the infant on Pepcid.  Continue to elevate the head for at least 30 minutes after feeds.  Will continue with AR formula.  Should see continued improvement over the next few months.  If any worsening symptoms, will have them return for further evaluation.  Ultrasound was done and is negative for pyloric stenosis.   light-headedness and headaches  Psychiatric/Behavioral: Positive for sleep disturbance  Negative for dysphoric mood  The patient is nervous/anxious  Objective:      /88   Pulse 86   Temp 99 1 °F (37 3 °C)   Ht 6' 1" (1 854 m)   Wt 114 kg (252 lb)   BMI 33 25 kg/m²          Physical Exam   Constitutional: He is oriented to person, place, and time  Vital signs are normal  He appears well-developed and well-nourished  He does not appear ill  HENT:   Head: Normocephalic and atraumatic  Eyes: Pupils are equal    Cardiovascular: Normal rate, regular rhythm, S1 normal and S2 normal     No murmur heard  Pulmonary/Chest: Effort normal and breath sounds normal  No respiratory distress  Neurological: He is alert and oriented to person, place, and time  Psychiatric: He has a normal mood and affect   Thought content normal

## 2024-01-04 NOTE — ASSESSMENT & PLAN NOTE
Patient blood pressure is overall doing well   He is to continue with losartan and hctz   Has propranolol also as needed for anxiety Pt able to tell RN that his official first name is Lewis, but he prefers to be called Marty.  (Pt has

## 2024-01-17 NOTE — PROGRESS NOTES
1330 - Patient arrived to [address] [place] [address], room 253. Admission, admission assessment completed. Patient has been accepted at [facility], awaiting bed. Wife at bedside.     1445 - IVF started as ordered. Zofran given for c/o nausea.    1615 - ABX hung as ordered, scheduled meds given.     1757 - Patient laying in bed watching tv. IVF changed to D5 NS d/t POC glucose result of 77. No s/s of low blood glucose. Will continue to monitor. CBWR.     Virtual Regular Visit      Assessment/Plan:    Problem List Items Addressed This Visit        Cardiovascular and Mediastinum    Essential hypertension - Primary     Patient reports overall he has been doing well  He states that he is not monitoring his blood pressures at home as routinely but overall feeling well  Patient reports that the propanolol has been extremely beneficial and helping with his symptoms and elevated blood pressures  He is currently on losartan 100 mg and HCTZ 12 5 mg  Also discussed with patient that he really needs to get his blood work completed  Patient has extreme anxiety over this and I am in agreement given a single dose of lorazepam for this procedure only  Relevant Orders    Lipid panel    Comprehensive metabolic panel    TSH, 3rd generation with Free T4 reflex       Other    Insomnia     Patient reports that he is doing well with his sleep using this in not only as needed  No changes recommended         Relevant Medications    zaleplon (SONATA) 5 MG capsule      Other Visit Diagnoses     Anxiety        Relevant Medications    LORazepam (ATIVAN) 0 5 mg tablet          BMI Counseling: There is no height or weight on file to calculate BMI  The BMI is above normal  Nutrition recommendations include decreasing portion sizes, moderation in carbohydrate intake, reducing intake of saturated and trans fat and reducing intake of cholesterol  Exercise recommendations include moderate physical activity 150 minutes/week and strength training exercises  Reason for visit is   Chief Complaint   Patient presents with    Virtual Regular Visit        Encounter provider AHMET Mcginnis    Provider located at [address] [place] [address] [phone]      Recent Visits  No visits were found meeting these conditions     Showing recent visits within past 7 days and meeting all other requirements     Today's Visits  Date Type Provider Dept   01/15/21 Telemedicine AHMET Cisse [facility]   Showing today's visits and meeting all other requirements     Future Appointments  No visits were found meeting these conditions  Showing future appointments within next 150 days and meeting all other requirements        The patient was identified by name and date of birth  Alexandrea Peters was informed that this is a telemedicine visit and that the visit is being conducted through [employer] and patient was informed that this is not a secure, HIPAA-compliant platform  He agrees to proceed     My office door was closed  No one else was in the room  He acknowledged consent and understanding of privacy and security of the video platform  The patient has agreed to participate and understands they can discontinue the visit at any time  Patient is aware this is a billable service  Subjective  Alexandrea Peters is a 28 y o  male    HTN: patient has been tracking his blood pressure  Insomnia; patient reports his sleep has been stable  He takes sonata as need and is effective  patient is a teacher  History reviewed  No pertinent past medical history  Past Surgical History:   Procedure Laterality Date    NO PAST SURGERIES         Current Outpatient Medications   Medication Sig Dispense Refill    hydrochlorothiazide (HYDRODIURIL) 12 5 mg tablet Take 1 tablet (12 5 mg total) by mouth daily 90 tablet 3    LORazepam (ATIVAN) 0 5 mg tablet Once 30 minute before procedure 1 tablet 0    losartan (COZAAR) 100 MG tablet TAKE 1 TABLET BY MOUTH  DAILY 90 tablet 1    propranolol (INDERAL) 10 mg tablet Take 1 tablet (10 mg total) by mouth 2 (two) times a day as needed (eleveated BP or (anxiety, stress)) 30 tablet 3    zaleplon (SONATA) 5 MG capsule Take 1 capsule (5 mg total) by mouth daily at bedtime 90 capsule 0     No current facility-administered medications for this visit           Allergies   Allergen Reactions    Penicillins Throat Swelling       Review of Systems   Constitutional: Negative for diaphoresis  Eyes: Negative for visual disturbance  Respiratory: Negative for chest tightness and shortness of breath  Cardiovascular: Negative for chest pain, palpitations and leg swelling  Neurological: Negative for dizziness, light-headedness and headaches  Psychiatric/Behavioral: Positive for sleep disturbance (sometimes)  The patient is nervous/anxious  Video Exam    There were no vitals filed for this visit  Physical Exam  Constitutional:       General: He is not in acute distress  Appearance: Normal appearance  He is well-developed  He is obese  He is not ill-appearing, toxic-appearing or diaphoretic  HENT:      Head: Normocephalic and atraumatic  Nose: Nose normal    Eyes:      Extraocular Movements: Extraocular movements intact  Conjunctiva/sclera: Conjunctivae normal    Pulmonary:      Effort: Pulmonary effort is normal  No respiratory distress  Skin:     Coloration: Skin is not pale  Neurological:      Mental Status: He is alert and oriented to person, place, and time  Psychiatric:         Mood and Affect: Mood normal          Behavior: Behavior normal           I spent 15 minutes directly with the patient during this visit      VIRTUAL VISIT DISCLAIMER    Jazmyn Solomon acknowledges that he has consented to an online visit or consultation  He understands that the online visit is based solely on information provided by him, and that, in the absence of a face-to-face physical evaluation by the physician, the diagnosis he receives is both limited and provisional in terms of accuracy and completeness  This is not intended to replace a full medical face-to-face evaluation by the physician  Jazmyn Solomon understands and accepts these terms

## 2024-03-07 DIAGNOSIS — F41.9 ANXIETY: ICD-10-CM

## 2024-03-07 DIAGNOSIS — I10 ESSENTIAL HYPERTENSION: ICD-10-CM

## 2024-03-07 RX ORDER — ESCITALOPRAM OXALATE 20 MG/1
20 TABLET ORAL DAILY
Qty: 90 TABLET | Refills: 1 | Status: SHIPPED | OUTPATIENT
Start: 2024-03-07

## 2024-03-07 RX ORDER — HYDROCHLOROTHIAZIDE 12.5 MG/1
12.5 TABLET ORAL DAILY
Qty: 90 TABLET | Refills: 1 | Status: SHIPPED | OUTPATIENT
Start: 2024-03-07

## 2024-03-14 ENCOUNTER — OFFICE VISIT (OUTPATIENT)
Dept: FAMILY MEDICINE CLINIC | Facility: CLINIC | Age: 39
End: 2024-03-14
Payer: COMMERCIAL

## 2024-03-14 ENCOUNTER — APPOINTMENT (OUTPATIENT)
Dept: LAB | Facility: CLINIC | Age: 39
End: 2024-03-14
Payer: COMMERCIAL

## 2024-03-14 VITALS
SYSTOLIC BLOOD PRESSURE: 126 MMHG | BODY MASS INDEX: 30.62 KG/M2 | WEIGHT: 231 LBS | HEART RATE: 97 BPM | HEIGHT: 73 IN | DIASTOLIC BLOOD PRESSURE: 80 MMHG | OXYGEN SATURATION: 97 %

## 2024-03-14 DIAGNOSIS — I10 ESSENTIAL HYPERTENSION: ICD-10-CM

## 2024-03-14 DIAGNOSIS — I10 ESSENTIAL HYPERTENSION: Primary | ICD-10-CM

## 2024-03-14 DIAGNOSIS — G47.00 INSOMNIA, UNSPECIFIED TYPE: ICD-10-CM

## 2024-03-14 DIAGNOSIS — F41.9 ANXIETY: ICD-10-CM

## 2024-03-14 LAB
ALBUMIN SERPL BCP-MCNC: 4.6 G/DL (ref 3.5–5)
ALP SERPL-CCNC: 74 U/L (ref 34–104)
ALT SERPL W P-5'-P-CCNC: 41 U/L (ref 7–52)
ANION GAP SERPL CALCULATED.3IONS-SCNC: 12 MMOL/L (ref 4–13)
AST SERPL W P-5'-P-CCNC: 36 U/L (ref 13–39)
BASOPHILS # BLD AUTO: 0.06 THOUSANDS/ÂΜL (ref 0–0.1)
BASOPHILS NFR BLD AUTO: 1 % (ref 0–1)
BILIRUB SERPL-MCNC: 0.77 MG/DL (ref 0.2–1)
BUN SERPL-MCNC: 10 MG/DL (ref 5–25)
CALCIUM SERPL-MCNC: 9.1 MG/DL (ref 8.4–10.2)
CHLORIDE SERPL-SCNC: 98 MMOL/L (ref 96–108)
CHOLEST SERPL-MCNC: 151 MG/DL
CO2 SERPL-SCNC: 27 MMOL/L (ref 21–32)
CREAT SERPL-MCNC: 0.87 MG/DL (ref 0.6–1.3)
EOSINOPHIL # BLD AUTO: 0.05 THOUSAND/ÂΜL (ref 0–0.61)
EOSINOPHIL NFR BLD AUTO: 1 % (ref 0–6)
ERYTHROCYTE [DISTWIDTH] IN BLOOD BY AUTOMATED COUNT: 12.3 % (ref 11.6–15.1)
GFR SERPL CREATININE-BSD FRML MDRD: 109 ML/MIN/1.73SQ M
GLUCOSE SERPL-MCNC: 83 MG/DL (ref 65–140)
HCT VFR BLD AUTO: 45.3 % (ref 36.5–49.3)
HDLC SERPL-MCNC: 69 MG/DL
HGB BLD-MCNC: 14.9 G/DL (ref 12–17)
IMM GRANULOCYTES # BLD AUTO: 0.02 THOUSAND/UL (ref 0–0.2)
IMM GRANULOCYTES NFR BLD AUTO: 1 % (ref 0–2)
LDLC SERPL CALC-MCNC: 70 MG/DL (ref 0–100)
LYMPHOCYTES # BLD AUTO: 1.44 THOUSANDS/ÂΜL (ref 0.6–4.47)
LYMPHOCYTES NFR BLD AUTO: 33 % (ref 14–44)
MCH RBC QN AUTO: 31.9 PG (ref 26.8–34.3)
MCHC RBC AUTO-ENTMCNC: 32.9 G/DL (ref 31.4–37.4)
MCV RBC AUTO: 97 FL (ref 82–98)
MONOCYTES # BLD AUTO: 0.43 THOUSAND/ÂΜL (ref 0.17–1.22)
MONOCYTES NFR BLD AUTO: 10 % (ref 4–12)
NEUTROPHILS # BLD AUTO: 2.37 THOUSANDS/ÂΜL (ref 1.85–7.62)
NEUTS SEG NFR BLD AUTO: 54 % (ref 43–75)
NRBC BLD AUTO-RTO: 0 /100 WBCS
PLATELET # BLD AUTO: 253 THOUSANDS/UL (ref 149–390)
PMV BLD AUTO: 11.1 FL (ref 8.9–12.7)
POTASSIUM SERPL-SCNC: 3.8 MMOL/L (ref 3.5–5.3)
PROT SERPL-MCNC: 7.4 G/DL (ref 6.4–8.4)
RBC # BLD AUTO: 4.67 MILLION/UL (ref 3.88–5.62)
SODIUM SERPL-SCNC: 137 MMOL/L (ref 135–147)
T4 FREE SERPL-MCNC: 0.85 NG/DL (ref 0.61–1.12)
TRIGL SERPL-MCNC: 62 MG/DL
TSH SERPL DL<=0.05 MIU/L-ACNC: 0.33 UIU/ML (ref 0.45–4.5)
WBC # BLD AUTO: 4.37 THOUSAND/UL (ref 4.31–10.16)

## 2024-03-14 PROCEDURE — 84439 ASSAY OF FREE THYROXINE: CPT

## 2024-03-14 PROCEDURE — 99214 OFFICE O/P EST MOD 30 MIN: CPT | Performed by: NURSE PRACTITIONER

## 2024-03-14 PROCEDURE — 84443 ASSAY THYROID STIM HORMONE: CPT

## 2024-03-14 PROCEDURE — 80061 LIPID PANEL: CPT

## 2024-03-14 PROCEDURE — 80053 COMPREHEN METABOLIC PANEL: CPT | Performed by: NURSE PRACTITIONER

## 2024-03-14 PROCEDURE — 85025 COMPLETE CBC W/AUTO DIFF WBC: CPT

## 2024-03-14 PROCEDURE — 36415 COLL VENOUS BLD VENIPUNCTURE: CPT | Performed by: NURSE PRACTITIONER

## 2024-03-14 RX ORDER — TRAZODONE HYDROCHLORIDE 100 MG/1
100 TABLET ORAL
Qty: 90 TABLET | Refills: 1 | Status: SHIPPED | OUTPATIENT
Start: 2024-03-14

## 2024-03-14 RX ORDER — PROPRANOLOL HYDROCHLORIDE 10 MG/1
10 TABLET ORAL 2 TIMES DAILY PRN
Qty: 180 TABLET | Refills: 1 | Status: SHIPPED | OUTPATIENT
Start: 2024-03-14

## 2024-03-14 RX ORDER — LORAZEPAM 0.5 MG/1
0.5 TABLET ORAL EVERY 8 HOURS PRN
Qty: 4 TABLET | Refills: 0 | Status: SHIPPED | OUTPATIENT
Start: 2024-03-14

## 2024-03-14 NOTE — ASSESSMENT & PLAN NOTE
Patient blood pressure is doing well. Continue on losartan and hctz. I placed lab orders today for non fasting so that patient can get lab work done today as he has been non compliant with this part.

## 2024-03-14 NOTE — PROGRESS NOTES
Name: Tab Gomes      : 1985      MRN: [id number]  Encounter Provider: AHMET Michael  Encounter Date: 3/14/2024   Encounter department: On license of [facility]    Assessment & Plan     1. Essential hypertension  Assessment & Plan:  Patient blood pressure is doing well. Continue on losartan and hctz. I placed lab orders today for non fasting so that patient can get lab work done today as he has been non compliant with this part.     Orders:  -     Comprehensive metabolic panel  -     Lipid Panel with Direct LDL reflex; Future  -     TSH, 3rd generation with Free T4 reflex; Future; Expected date: 2024  -     propranolol (INDERAL) 10 mg tablet; Take 1 tablet (10 mg total) by mouth 2 (two) times a day as needed (eleveated BP or (anxiety, stress))  -     CBC and differential; Future    2. Anxiety  Assessment & Plan:  Patient anxiety stable on lexapro and propranolol. He does have flight upcoming for which he takes as needed benzo this was filled today.     Orders:  -     LORazepam (Ativan) 0.5 mg tablet; Take 1 tablet (0.5 mg total) by mouth every 8 (eight) hours as needed for anxiety    3. Insomnia, unspecified type  Assessment & Plan:  Stable on trazodone     Orders:  -     traZODone (DESYREL) 100 mg tablet; Take 1 tablet (100 mg total) by mouth daily at bedtime           Subjective      HPI  Review of Systems   Constitutional:  Negative for appetite change and unexpected weight change.   Respiratory:  Negative for chest tightness.    Cardiovascular:  Negative for chest pain and palpitations.   Neurological:  Negative for dizziness and headaches.   Psychiatric/Behavioral:  The patient is nervous/anxious.        Current Outpatient Medications on File Prior to Visit   Medication Sig   • escitalopram (LEXAPRO) 20 mg tablet TAKE 1 TABLET DAILY   • fexofenadine (ALLEGRA) 180 MG tablet Take 180 mg by mouth daily   • hydroCHLOROthiazide 12.5 mg tablet TAKE 1 TABLET DAILY   • losartan  "(COZAAR) 100 MG tablet Take 1 tablet (100 mg total) by mouth daily   • [DISCONTINUED] propranolol (INDERAL) 10 mg tablet Take 1 tablet (10 mg total) by mouth 2 (two) times a day as needed (eleveated BP or (anxiety, stress))   • [DISCONTINUED] traZODone (DESYREL) 100 mg tablet Take 1 tablet (100 mg total) by mouth daily at bedtime       Objective     /80 (BP Location: Left arm, Patient Position: Sitting, Cuff Size: Standard)   Pulse 97   Ht 6' 1\" (1.854 m)   Wt 105 kg (231 lb)   SpO2 97%   BMI 30.48 kg/m²     Physical Exam  Vitals and nursing note reviewed.   Constitutional:       Appearance: Normal appearance. He is well-developed. He is obese. He is not ill-appearing.   HENT:      Head: Normocephalic and atraumatic.   Eyes:      Extraocular Movements: Extraocular movements intact.      Conjunctiva/sclera: Conjunctivae normal.      Pupils: Pupils are equal.   Cardiovascular:      Rate and Rhythm: Normal rate and regular rhythm.      Heart sounds: S1 normal and S2 normal. No murmur heard.  Pulmonary:      Effort: Pulmonary effort is normal. No respiratory distress.      Breath sounds: Normal breath sounds.   Neurological:      Mental Status: He is alert and oriented to person, place, and time.   Psychiatric:         Mood and Affect: Mood normal.         Thought Content: Thought content normal.         Judgment: Judgment normal.       AHMET Michael    "

## 2024-03-14 NOTE — ASSESSMENT & PLAN NOTE
Patient anxiety stable on lexapro and propranolol. He does have flight upcoming for which he takes as needed benzo this was filled today.

## 2024-04-03 DIAGNOSIS — R79.89 ABNORMAL TSH: Primary | ICD-10-CM

## 2024-04-04 ENCOUNTER — TELEPHONE (OUTPATIENT)
Dept: FAMILY MEDICINE CLINIC | Facility: CLINIC | Age: 39
End: 2024-04-04

## 2024-04-04 ENCOUNTER — TELEPHONE (OUTPATIENT)
Age: 39
End: 2024-04-04

## 2024-04-04 NOTE — TELEPHONE ENCOUNTER
----- Message from AHMET Land sent at 4/3/2024 10:39 PM EDT -----  Thyroid is a little overactive would like to recheck this again in 8 weeks. New labs ordered. All other labs looked great!

## 2024-04-04 NOTE — TELEPHONE ENCOUNTER
Rcvd call from patient returning call for lab results. Read Zelda's note to him:    Thyroid is a little overactive would like to recheck this again in 8 weeks. New labs ordered. All other labs looked great!      Patient stated understanding, will get labs done in 8 weeks.

## 2024-09-03 DIAGNOSIS — I10 ESSENTIAL HYPERTENSION: ICD-10-CM

## 2024-09-03 DIAGNOSIS — F41.9 ANXIETY: ICD-10-CM

## 2024-09-03 RX ORDER — ESCITALOPRAM OXALATE 20 MG/1
20 TABLET ORAL DAILY
Qty: 90 TABLET | Refills: 1 | Status: SHIPPED | OUTPATIENT
Start: 2024-09-03 | End: 2024-09-13 | Stop reason: SDUPTHER

## 2024-09-03 RX ORDER — HYDROCHLOROTHIAZIDE 12.5 MG/1
12.5 TABLET ORAL DAILY
Qty: 90 TABLET | Refills: 1 | Status: SHIPPED | OUTPATIENT
Start: 2024-09-03 | End: 2024-09-13 | Stop reason: SDUPTHER

## 2024-09-13 ENCOUNTER — OFFICE VISIT (OUTPATIENT)
Dept: FAMILY MEDICINE CLINIC | Facility: CLINIC | Age: 39
End: 2024-09-13
Payer: COMMERCIAL

## 2024-09-13 VITALS
DIASTOLIC BLOOD PRESSURE: 84 MMHG | SYSTOLIC BLOOD PRESSURE: 120 MMHG | WEIGHT: 220 LBS | HEART RATE: 87 BPM | OXYGEN SATURATION: 97 % | HEIGHT: 73 IN | BODY MASS INDEX: 29.16 KG/M2

## 2024-09-13 DIAGNOSIS — F41.9 ANXIETY: ICD-10-CM

## 2024-09-13 DIAGNOSIS — G47.00 INSOMNIA, UNSPECIFIED TYPE: ICD-10-CM

## 2024-09-13 DIAGNOSIS — I10 ESSENTIAL HYPERTENSION: Primary | ICD-10-CM

## 2024-09-13 PROCEDURE — 99214 OFFICE O/P EST MOD 30 MIN: CPT | Performed by: NURSE PRACTITIONER

## 2024-09-13 RX ORDER — HYDROCHLOROTHIAZIDE 12.5 MG/1
12.5 TABLET ORAL DAILY
Qty: 90 TABLET | Refills: 1 | Status: SHIPPED | OUTPATIENT
Start: 2024-09-13

## 2024-09-13 RX ORDER — LOSARTAN POTASSIUM 100 MG/1
100 TABLET ORAL DAILY
Qty: 90 TABLET | Refills: 1 | Status: SHIPPED | OUTPATIENT
Start: 2024-09-13

## 2024-09-13 RX ORDER — ESCITALOPRAM OXALATE 20 MG/1
20 TABLET ORAL DAILY
Qty: 90 TABLET | Refills: 1 | Status: SHIPPED | OUTPATIENT
Start: 2024-09-13

## 2024-09-13 RX ORDER — TRAZODONE HYDROCHLORIDE 100 MG/1
100 TABLET ORAL
Qty: 90 TABLET | Refills: 1 | Status: SHIPPED | OUTPATIENT
Start: 2024-09-13

## 2024-09-13 NOTE — ASSESSMENT & PLAN NOTE
Taking trazodone nightly and continues to get good relief with his medications     Orders:    traZODone (DESYREL) 100 mg tablet; Take 1 tablet (100 mg total) by mouth daily at bedtime

## 2024-09-13 NOTE — PROGRESS NOTES
"Ambulatory Visit  Name: Tab Gomes      : 1985      MRN: [id number]  Encounter Provider: AHMET Michael  Encounter Date: 2024   Encounter department: [facility]    Assessment & Plan  Essential hypertension  Patient blood pressure is doing well   He is on losartan 100mg and hctz 12.5mg   Takes propranolol as well as needed   Continue same medications     Orders:    losartan (COZAAR) 100 MG tablet; Take 1 tablet (100 mg total) by mouth daily    hydroCHLOROthiazide 12.5 mg tablet; Take 1 tablet (12.5 mg total) by mouth daily    TSH, 3rd generation with Free T4 reflex; Future    Lipid Panel with Direct LDL reflex; Future    Comprehensive metabolic panel; Future    Anxiety  Patient continues to remain stable on lexapro and takes propranolol as needed     Orders:    escitalopram (LEXAPRO) 20 mg tablet; Take 1 tablet (20 mg total) by mouth daily    Insomnia, unspecified type  Taking trazodone nightly and continues to get good relief with his medications     Orders:    traZODone (DESYREL) 100 mg tablet; Take 1 tablet (100 mg total) by mouth daily at bedtime      Depression Screening and Follow-up Plan: Patient was screened for depression during today's encounter. They screened negative with a PHQ-2 score of 0.      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   Respiratory:  Negative for chest tightness and shortness of breath.    Cardiovascular:  Negative for chest pain and palpitations.   Psychiatric/Behavioral:  Negative for dysphoric mood and sleep disturbance.            Objective     /84 (BP Location: Left arm, Patient Position: Sitting, Cuff Size: Standard)   Pulse 87   Ht 6' 1\" (1.854 m) "   Wt 99.8 kg (220 lb)   SpO2 97%   BMI 29.03 kg/m²     Physical Exam  Vitals and nursing note reviewed.   Constitutional:       Appearance: Normal appearance. He is well-developed and overweight. He is not ill-appearing.   HENT:      Head: Normocephalic and atraumatic.   Eyes:      Extraocular Movements: Extraocular movements intact.      Conjunctiva/sclera: Conjunctivae normal.      Pupils: Pupils are equal.   Cardiovascular:      Rate and Rhythm: Normal rate and regular rhythm.      Heart sounds: S1 normal and S2 normal. No murmur heard.  Pulmonary:      Effort: Pulmonary effort is normal. No respiratory distress.      Breath sounds: Normal breath sounds.   Neurological:      Mental Status: He is alert and oriented to person, place, and time.   Psychiatric:         Mood and Affect: Mood normal.         Thought Content: Thought content normal.

## 2024-09-13 NOTE — ASSESSMENT & PLAN NOTE
Patient continues to remain stable on lexapro and takes propranolol as needed     Orders:    escitalopram (LEXAPRO) 20 mg tablet; Take 1 tablet (20 mg total) by mouth daily

## 2024-09-13 NOTE — ASSESSMENT & PLAN NOTE
Patient blood pressure is doing well   He is on losartan 100mg and hctz 12.5mg   Takes propranolol as well as needed   Continue same medications     Orders:    losartan (COZAAR) 100 MG tablet; Take 1 tablet (100 mg total) by mouth daily    hydroCHLOROthiazide 12.5 mg tablet; Take 1 tablet (12.5 mg total) by mouth daily    TSH, 3rd generation with Free T4 reflex; Future    Lipid Panel with Direct LDL reflex; Future    Comprehensive metabolic panel; Future

## 2025-02-26 DIAGNOSIS — F41.9 ANXIETY: ICD-10-CM

## 2025-02-26 NOTE — TELEPHONE ENCOUNTER
Reason for call:   [x] Refill   [] Prior Auth  [x] Other: Patient flying on 3/6/25    Office:   [x] PCP/Provider - [facility]   [] Specialty/Provider -     Medication:     LORazepam (Ativan) 0.5 mg tab       Dose/Frequency: 0.5 mg, Every 8 hours PRN     [id number]    Does the patient have enough for 3 days?   [x] Yes   [] No - Send as HP to POD

## 2025-02-28 RX ORDER — LORAZEPAM 0.5 MG/1
0.5 TABLET ORAL EVERY 8 HOURS PRN
Qty: 4 TABLET | Refills: 0 | Status: SHIPPED | OUTPATIENT
Start: 2025-02-28

## 2025-03-12 DIAGNOSIS — G47.00 INSOMNIA, UNSPECIFIED TYPE: ICD-10-CM

## 2025-03-12 DIAGNOSIS — I10 ESSENTIAL HYPERTENSION: ICD-10-CM

## 2025-03-13 RX ORDER — TRAZODONE HYDROCHLORIDE 100 MG/1
100 TABLET ORAL
Qty: 90 TABLET | Refills: 1 | Status: SHIPPED | OUTPATIENT
Start: 2025-03-13

## 2025-03-13 RX ORDER — LOSARTAN POTASSIUM 100 MG/1
100 TABLET ORAL DAILY
Qty: 90 TABLET | Refills: 1 | Status: SHIPPED | OUTPATIENT
Start: 2025-03-13

## 2025-04-04 ENCOUNTER — TELEPHONE (OUTPATIENT)
Dept: FAMILY MEDICINE CLINIC | Facility: CLINIC | Age: 40
End: 2025-04-04

## 2025-04-04 ENCOUNTER — OFFICE VISIT (OUTPATIENT)
Dept: FAMILY MEDICINE CLINIC | Facility: CLINIC | Age: 40
End: 2025-04-04
Payer: COMMERCIAL

## 2025-04-04 VITALS
HEART RATE: 86 BPM | DIASTOLIC BLOOD PRESSURE: 86 MMHG | BODY MASS INDEX: 29.72 KG/M2 | HEIGHT: 73 IN | RESPIRATION RATE: 16 BRPM | OXYGEN SATURATION: 98 % | WEIGHT: 224.2 LBS | SYSTOLIC BLOOD PRESSURE: 124 MMHG | TEMPERATURE: 98.1 F

## 2025-04-04 DIAGNOSIS — F41.9 ANXIETY: ICD-10-CM

## 2025-04-04 DIAGNOSIS — G56.02 MILD CARPAL TUNNEL SYNDROME, LEFT: ICD-10-CM

## 2025-04-04 DIAGNOSIS — I10 ESSENTIAL HYPERTENSION: ICD-10-CM

## 2025-04-04 DIAGNOSIS — G47.00 INSOMNIA, UNSPECIFIED TYPE: ICD-10-CM

## 2025-04-04 DIAGNOSIS — Z00.00 ANNUAL PHYSICAL EXAM: Primary | ICD-10-CM

## 2025-04-04 PROCEDURE — 99214 OFFICE O/P EST MOD 30 MIN: CPT | Performed by: NURSE PRACTITIONER

## 2025-04-04 PROCEDURE — 99396 PREV VISIT EST AGE 40-64: CPT | Performed by: NURSE PRACTITIONER

## 2025-04-04 RX ORDER — ESCITALOPRAM OXALATE 10 MG/1
10 TABLET ORAL DAILY
Qty: 90 TABLET | Refills: 0 | Status: SHIPPED | OUTPATIENT
Start: 2025-04-04

## 2025-04-04 RX ORDER — LOSARTAN POTASSIUM 100 MG/1
100 TABLET ORAL DAILY
Qty: 90 TABLET | Refills: 1 | Status: SHIPPED | OUTPATIENT
Start: 2025-04-04

## 2025-04-04 RX ORDER — ESCITALOPRAM OXALATE 20 MG/1
20 TABLET ORAL DAILY
Qty: 90 TABLET | Refills: 1 | Status: SHIPPED | OUTPATIENT
Start: 2025-04-04 | End: 2025-04-04

## 2025-04-04 RX ORDER — TRAZODONE HYDROCHLORIDE 100 MG/1
100 TABLET ORAL
Qty: 90 TABLET | Refills: 1 | Status: SHIPPED | OUTPATIENT
Start: 2025-04-04

## 2025-04-04 RX ORDER — HYDROCHLOROTHIAZIDE 12.5 MG/1
12.5 TABLET ORAL DAILY
Qty: 90 TABLET | Refills: 1 | Status: SHIPPED | OUTPATIENT
Start: 2025-04-04

## 2025-04-04 NOTE — ASSESSMENT & PLAN NOTE
Patient reports anxiety is doing really well would like to start going down  We can decrease to 10mg    Orders:  •  escitalopram (LEXAPRO) 10 mg tablet; Take 1 tablet (10 mg total) by mouth daily

## 2025-04-04 NOTE — PATIENT INSTRUCTIONS
"Patient Education     Routine physical for adults   The Basics   Written by the doctors and editors at [facility]   What is a physical? -- A physical is a routine visit, or \"check-up,\" with your doctor. You might also hear it called a \"wellness visit\" or \"preventive visit.\"  During each visit, the doctor will:   Ask about your physical and mental health   Ask about your habits, behaviors, and lifestyle   Do an exam   Give you vaccines if needed   Talk to you about any medicines you take   Give advice about your health   Answer your questions  Getting regular check-ups is an important part of taking care of your health. It can help your doctor find and treat any problems you have. But it's also important for preventing health problems.  A routine physical is different from a \"sick visit.\" A sick visit is when you see a doctor because of a health concern or problem. Since physicals are scheduled ahead of time, you can think about what you want to ask the doctor.  How often should I get a physical? -- It depends on your age and health. In general, for people age 21 years and older:   If you are younger than 50 years, you might be able to get a physical every 3 years.   If you are 50 years or older, your doctor might recommend a physical every year.  If you have an ongoing health condition, like diabetes or high blood pressure, your doctor will probably want to see you more often.  What happens during a physical? -- In general, each visit will include:   Physical exam - The doctor or nurse will check your height, weight, heart rate, and blood pressure. They will also look at your eyes and ears. They will ask about how you are feeling and whether you have any symptoms that bother you.   Medicines - It's a good idea to bring a list of all the medicines you take to each doctor visit. Your doctor will talk to you about your medicines and answer any questions. Tell them if you are having any side effects that bother you. You " "should also tell them if you are having trouble paying for any of your medicines.   Habits and behaviors - This includes:   Your diet   Your exercise habits   Whether you smoke, drink alcohol, or use drugs   Whether you are sexually active   Whether you feel safe at home  Your doctor will talk to you about things you can do to improve your health and lower your risk of health problems. They will also offer help and support. For example, if you want to quit smoking, they can give you advice and might prescribe medicines. If you want to improve your diet or get more physical activity, they can help you with this, too.   Lab tests, if needed - The tests you get will depend on your age and situation. For example, your doctor might want to check your:   Cholesterol   Blood sugar   Iron level   Vaccines - The recommended vaccines will depend on your age, health, and what vaccines you already had. Vaccines are very important because they can prevent certain serious or deadly infections.   Discussion of screening - \"Screening\" means checking for diseases or other health problems before they cause symptoms. Your doctor can recommend screening based on your age, risk, and preferences. This might include tests to check for:   Cancer, such as breast, prostate, cervical, ovarian, colorectal, prostate, lung, or skin cancer   Sexually transmitted infections, such as chlamydia and gonorrhea   Mental health conditions like depression and anxiety  Your doctor will talk to you about the different types of screening tests. They can help you decide which screenings to have. They can also explain what the results might mean.   Answering questions - The physical is a good time to ask the doctor or nurse questions about your health. If needed, they can refer you to other doctors or specialists, too.  Adults older than 65 years often need other care, too. As you get older, your doctor will talk to you about:   How to prevent falling at " home   Hearing or vision tests   Memory testing   How to take your medicines safely   Making sure that you have the help and support you need at home  All topics are updated as new evidence becomes available and our peer review process is complete.  This topic retrieved from [employer] on: May 02, 2024.  Topic 896434 Version 1.0  Release: 32.4.3 - C32.122  © 2024 UpToDate, Inc. and/or its affiliates. All rights reserved.  Consumer Information Use and Disclaimer   Disclaimer: This generalized information is a limited summary of diagnosis, treatment, and/or medication information. It is not meant to be comprehensive and should be used as a tool to help the user understand and/or assess potential diagnostic and treatment options. It does NOT include all information about conditions, treatments, medications, side effects, or risks that may apply to a specific patient. It is not intended to be medical advice or a substitute for the medical advice, diagnosis, or treatment of a health care provider based on the health care provider's examination and assessment of a patient's specific and unique circumstances. Patients must speak with a health care provider for complete information about their health, medical questions, and treatment options, including any risks or benefits regarding use of medications. This information does not endorse any treatments or medications as safe, effective, or approved for treating a specific patient. UpToDate, Inc. and its affiliates disclaim any warranty or liability relating to this information or the use thereof.The use of this information is governed by the Terms of Use, available at https://www.woltersiCetanauwer.com/en/know/clinical-effectiveness-terms. 2024© UpToDate, Inc. and its affiliates and/or licensors. All rights reserved.  Copyright   © 2024 Up[employer]Date, Inc. and/or its affiliates. All rights reserved.

## 2025-04-04 NOTE — TELEPHONE ENCOUNTER
Spoke to [facility] pharmacist copies of most recent Tdap and Flu shots will be faxed to our back office fax [phone].

## 2025-04-04 NOTE — ASSESSMENT & PLAN NOTE
Patient continues to well  Taking as needed   Orders:  •  traZODone (DESYREL) 100 mg tablet; Take 1 tablet (100 mg total) by mouth daily at bedtime

## 2025-04-04 NOTE — PROGRESS NOTES
Adult Annual Physical  Name: Tab Gomes      : 1985      MRN: [id number]  Encounter Provider: AHMET Michael  Encounter Date: 2025   Encounter department: [facility]    :  Assessment & Plan  Insomnia, unspecified type  Patient continues to well  Taking as needed   Orders:  •  traZODone (DESYREL) 100 mg tablet; Take 1 tablet (100 mg total) by mouth daily at bedtime    Essential hypertension  Patient blood pressure is doing well   Continue on losartan and HCTZ   Orders:  •  losartan (COZAAR) 100 MG tablet; Take 1 tablet (100 mg total) by mouth daily  •  hydroCHLOROthiazide 12.5 mg tablet; Take 1 tablet (12.5 mg total) by mouth daily    Anxiety  Patient reports anxiety is doing really well would like to start going down  We can decrease to 10mg    Orders:  •  escitalopram (LEXAPRO) 10 mg tablet; Take 1 tablet (10 mg total) by mouth daily    Annual physical exam         Mild carpal tunnel syndrome, left  Discussed exercises and stretching of carpal space and use of brace at night  Most likely from overuse during vacation   Follow up as needed            Preventive Screenings:  - Diabetes Screening: risks/benefits discussed and orders placed  - Cholesterol Screening: risks/benefits discussed and orders placed   - Hepatitis C screening: risks/benefits discussed and patient declines   - HIV screening: risks/benefits discussed and patient declines   - Colon cancer screening: screening not indicated   - Lung cancer screening: screening not indicated   - Prostate cancer screening: screening not indicated     Immunizations:    - The patient declines recommended vaccines currently despite my recommendations      Counseling/Anticipatory Guidance:  - Alcohol: discussed moderation in alcohol intake and recommendations for healthy alcohol use.   - Dental health: discussed importance of regular tooth brushing, flossing, and dental visits.   - Sexual health: discussed sexually transmitted  diseases, partner selection, use of condoms, avoidance of unintended pregnancy, and contraceptive alternatives.   - Diet: discussed recommendations for a healthy/well-balanced diet.   - Exercise: the importance of regular exercise/physical activity was discussed. Recommend exercise 3-5 times per week for at least 30 minutes.       Depression Screening and Follow-up Plan: Patient was screened for depression during today's encounter. They screened negative with a PHQ-2 score of 0.          History of Present Illness     Adult Annual Physical:  Patient presents for annual physical. Patient presents today for follow up and for physical   He has labs already ordered that was not completed     Patient is on medications for his anxiety and his blood pressure    Both appear to be doing well today   He is also sleeping well     Patient is right hand dominant   He has been getting some tingling in his left wrist since coming from vacation   He does wear a watch on his wrist   He was skiing in [place] and was carrying .     Diet and Physical Activity:  - Diet/Nutrition: no special diet.  - Exercise: walking.    Depression Screening:  - PHQ-2 Score: 0    General Health:  - Sleep: sleeps well.  - Hearing: normal hearing bilateral ears.  - Vision: no vision problems.  - Dental: no dental visits for > 1 year.     Health:    - Urinary symptoms: none.     Review of Systems   Constitutional:  Negative for unexpected weight change.   Respiratory:  Negative for chest tightness and shortness of breath.    Cardiovascular:  Negative for chest pain, palpitations and leg swelling.   Gastrointestinal:  Negative for abdominal pain.   Genitourinary:  Negative for difficulty urinating.   Musculoskeletal:  Positive for myalgias. Negative for arthralgias.   Neurological:  Negative for dizziness, light-headedness and headaches.   Psychiatric/Behavioral:  Negative for dysphoric mood and sleep disturbance. The patient is not nervous/anxious.   "        Objective   /86 (BP Location: Left arm, Patient Position: Sitting, Cuff Size: Adult)   Pulse 86   Temp 98.1 °F (36.7 °C) (Temporal)   Resp 16   Ht 6' 1\" (1.854 m)   Wt 102 kg (224 lb 3.2 oz)   SpO2 98%   BMI 29.58 kg/m²     Physical Exam  Vitals and nursing note reviewed.   Constitutional:       General: He is not in acute distress.     Appearance: He is well-developed and overweight. He is not ill-appearing, toxic-appearing or diaphoretic.   HENT:      Head: Normocephalic and atraumatic.      Right Ear: Tympanic membrane and external ear normal.      Left Ear: Tympanic membrane and external ear normal.      Nose: Nose normal.      Mouth/Throat:      Mouth: Mucous membranes are moist.      Pharynx: Uvula midline. No oropharyngeal exudate or posterior oropharyngeal erythema.   Eyes:      General: No scleral icterus.     Conjunctiva/sclera: Conjunctivae normal.      Pupils: Pupils are equal, round, and reactive to light.   Neck:      Thyroid: No thyromegaly.      Vascular: No carotid bruit or JVD.   Cardiovascular:      Rate and Rhythm: Normal rate and regular rhythm.      Pulses:           Carotid pulses are 2+ on the right side and 2+ on the left side.     Heart sounds: Normal heart sounds.   Pulmonary:      Effort: Pulmonary effort is normal. No respiratory distress.      Breath sounds: Normal breath sounds.   Abdominal:      General: Bowel sounds are normal. There is no distension.      Palpations: Abdomen is soft.      Tenderness: There is no abdominal tenderness.   Musculoskeletal:         General: Normal range of motion.      Left elbow: No swelling, deformity or effusion. Normal range of motion. No tenderness.      Left wrist: Tenderness present. No swelling, bony tenderness or snuff box tenderness. Normal range of motion.      Cervical back: Normal range of motion.      Right lower leg: No edema.      Left lower leg: No edema.      Comments: Tinel positive left wrist   Lymphadenopathy:    "   Cervical: No cervical adenopathy.   Neurological:      Mental Status: He is alert and oriented to person, place, and time.      Motor: Motor function is intact.      Gait: Gait is intact. Gait normal.   Psychiatric:         Attention and Perception: Attention normal.         Mood and Affect: Mood normal.         Speech: Speech normal.         Behavior: Behavior normal. Behavior is cooperative.         Thought Content: Thought content normal.

## 2025-04-04 NOTE — ASSESSMENT & PLAN NOTE
Patient blood pressure is doing well   Continue on losartan and HCTZ   Orders:  •  losartan (COZAAR) 100 MG tablet; Take 1 tablet (100 mg total) by mouth daily  •  hydroCHLOROthiazide 12.5 mg tablet; Take 1 tablet (12.5 mg total) by mouth daily

## 2025-06-16 DIAGNOSIS — F41.9 ANXIETY: ICD-10-CM

## 2025-06-16 RX ORDER — ESCITALOPRAM OXALATE 10 MG/1
10 TABLET ORAL DAILY
Qty: 90 TABLET | Refills: 1 | Status: SHIPPED | OUTPATIENT
Start: 2025-06-16